# Patient Record
Sex: FEMALE | Race: WHITE | NOT HISPANIC OR LATINO | Employment: UNEMPLOYED | URBAN - METROPOLITAN AREA
[De-identification: names, ages, dates, MRNs, and addresses within clinical notes are randomized per-mention and may not be internally consistent; named-entity substitution may affect disease eponyms.]

---

## 2017-04-12 ENCOUNTER — APPOINTMENT (OUTPATIENT)
Dept: RADIOLOGY | Facility: MEDICAL CENTER | Age: 47
End: 2017-04-12
Attending: OBSTETRICS & GYNECOLOGY
Payer: MEDICAID

## 2017-04-12 ENCOUNTER — HOSPITAL ENCOUNTER (OUTPATIENT)
Facility: MEDICAL CENTER | Age: 47
End: 2017-04-12
Attending: OBSTETRICS & GYNECOLOGY | Admitting: OBSTETRICS & GYNECOLOGY
Payer: MEDICAID

## 2017-04-12 VITALS
OXYGEN SATURATION: 95 % | HEIGHT: 61 IN | HEART RATE: 49 BPM | RESPIRATION RATE: 18 BRPM | BODY MASS INDEX: 20.73 KG/M2 | SYSTOLIC BLOOD PRESSURE: 102 MMHG | TEMPERATURE: 97.5 F | DIASTOLIC BLOOD PRESSURE: 61 MMHG | WEIGHT: 109.79 LBS

## 2017-04-12 PROBLEM — N83.00 FOLLICULAR CYST OF OVARY: Status: ACTIVE | Noted: 2017-04-12

## 2017-04-12 LAB
ANION GAP SERPL CALC-SCNC: 7 MMOL/L (ref 0–11.9)
APTT PPP: 31 SEC (ref 24.7–36)
BASOPHILS # BLD AUTO: 0.5 % (ref 0–1.8)
BASOPHILS # BLD: 0.03 K/UL (ref 0–0.12)
BUN SERPL-MCNC: 19 MG/DL (ref 8–22)
CALCIUM SERPL-MCNC: 9.4 MG/DL (ref 8.5–10.5)
CHLORIDE SERPL-SCNC: 112 MMOL/L (ref 96–112)
CO2 SERPL-SCNC: 20 MMOL/L (ref 20–33)
CREAT SERPL-MCNC: 0.8 MG/DL (ref 0.5–1.4)
EKG IMPRESSION: NORMAL
EOSINOPHIL # BLD AUTO: 0.08 K/UL (ref 0–0.51)
EOSINOPHIL NFR BLD: 1.4 % (ref 0–6.9)
ERYTHROCYTE [DISTWIDTH] IN BLOOD BY AUTOMATED COUNT: 40.3 FL (ref 35.9–50)
GFR SERPL CREATININE-BSD FRML MDRD: >60 ML/MIN/1.73 M 2
GLUCOSE SERPL-MCNC: 90 MG/DL (ref 65–99)
HCG SERPL QL: NEGATIVE
HCT VFR BLD AUTO: 37.7 % (ref 37–47)
HGB BLD-MCNC: 12.9 G/DL (ref 12–16)
IMM GRANULOCYTES # BLD AUTO: 0.01 K/UL (ref 0–0.11)
IMM GRANULOCYTES NFR BLD AUTO: 0.2 % (ref 0–0.9)
INR PPP: 1.04 (ref 0.87–1.13)
LYMPHOCYTES # BLD AUTO: 2.12 K/UL (ref 1–4.8)
LYMPHOCYTES NFR BLD: 36.1 % (ref 22–41)
MCH RBC QN AUTO: 31.8 PG (ref 27–33)
MCHC RBC AUTO-ENTMCNC: 34.2 G/DL (ref 33.6–35)
MCV RBC AUTO: 92.9 FL (ref 81.4–97.8)
MONOCYTES # BLD AUTO: 0.37 K/UL (ref 0–0.85)
MONOCYTES NFR BLD AUTO: 6.3 % (ref 0–13.4)
NEUTROPHILS # BLD AUTO: 3.26 K/UL (ref 2–7.15)
NEUTROPHILS NFR BLD: 55.5 % (ref 44–72)
NRBC # BLD AUTO: 0 K/UL
NRBC BLD AUTO-RTO: 0 /100 WBC
PLATELET # BLD AUTO: 220 K/UL (ref 164–446)
PMV BLD AUTO: 11.8 FL (ref 9–12.9)
POTASSIUM SERPL-SCNC: 3.7 MMOL/L (ref 3.6–5.5)
PROTHROMBIN TIME: 13.9 SEC (ref 12–14.6)
RBC # BLD AUTO: 4.06 M/UL (ref 4.2–5.4)
SODIUM SERPL-SCNC: 139 MMOL/L (ref 135–145)
WBC # BLD AUTO: 5.9 K/UL (ref 4.8–10.8)

## 2017-04-12 PROCEDURE — 502240 HCHG MISC OR SUPPLY RC 0272: Performed by: OBSTETRICS & GYNECOLOGY

## 2017-04-12 PROCEDURE — 85610 PROTHROMBIN TIME: CPT

## 2017-04-12 PROCEDURE — A4338 INDWELLING CATHETER LATEX: HCPCS | Performed by: OBSTETRICS & GYNECOLOGY

## 2017-04-12 PROCEDURE — 501579 HCHG TROCAR, STEP 5MM: Performed by: OBSTETRICS & GYNECOLOGY

## 2017-04-12 PROCEDURE — 85025 COMPLETE CBC W/AUTO DIFF WBC: CPT

## 2017-04-12 PROCEDURE — 500854 HCHG NEEDLE, INSUFFLATION FOR STEP: Performed by: OBSTETRICS & GYNECOLOGY

## 2017-04-12 PROCEDURE — 501399 HCHG SPECIMAN BAG, ENDO CATC: Performed by: OBSTETRICS & GYNECOLOGY

## 2017-04-12 PROCEDURE — 160035 HCHG PACU - 1ST 60 MINS PHASE I: Performed by: OBSTETRICS & GYNECOLOGY

## 2017-04-12 PROCEDURE — 93010 ELECTROCARDIOGRAM REPORT: CPT | Performed by: INTERNAL MEDICINE

## 2017-04-12 PROCEDURE — 160009 HCHG ANES TIME/MIN: Performed by: OBSTETRICS & GYNECOLOGY

## 2017-04-12 PROCEDURE — 502704 HCHG DEVICE, LIGASURE IMPACT: Performed by: OBSTETRICS & GYNECOLOGY

## 2017-04-12 PROCEDURE — 160029 HCHG SURGERY MINUTES - 1ST 30 MINS LEVEL 4: Performed by: OBSTETRICS & GYNECOLOGY

## 2017-04-12 PROCEDURE — 501838 HCHG SUTURE GENERAL: Performed by: OBSTETRICS & GYNECOLOGY

## 2017-04-12 PROCEDURE — A9270 NON-COVERED ITEM OR SERVICE: HCPCS

## 2017-04-12 PROCEDURE — 88307 TISSUE EXAM BY PATHOLOGIST: CPT

## 2017-04-12 PROCEDURE — 160041 HCHG SURGERY MINUTES - EA ADDL 1 MIN LEVEL 4: Performed by: OBSTETRICS & GYNECOLOGY

## 2017-04-12 PROCEDURE — 160048 HCHG OR STATISTICAL LEVEL 1-5: Performed by: OBSTETRICS & GYNECOLOGY

## 2017-04-12 PROCEDURE — 500886 HCHG PACK, LAPAROSCOPY: Performed by: OBSTETRICS & GYNECOLOGY

## 2017-04-12 PROCEDURE — 93005 ELECTROCARDIOGRAM TRACING: CPT | Performed by: OBSTETRICS & GYNECOLOGY

## 2017-04-12 PROCEDURE — 84703 CHORIONIC GONADOTROPIN ASSAY: CPT

## 2017-04-12 PROCEDURE — 501330 HCHG SET, CYSTO IRRIG TUBING: Performed by: OBSTETRICS & GYNECOLOGY

## 2017-04-12 PROCEDURE — A4606 OXYGEN PROBE USED W OXIMETER: HCPCS | Performed by: OBSTETRICS & GYNECOLOGY

## 2017-04-12 PROCEDURE — 700101 HCHG RX REV CODE 250

## 2017-04-12 PROCEDURE — 110382 HCHG SHELL REV 271: Performed by: OBSTETRICS & GYNECOLOGY

## 2017-04-12 PROCEDURE — 160002 HCHG RECOVERY MINUTES (STAT): Performed by: OBSTETRICS & GYNECOLOGY

## 2017-04-12 PROCEDURE — 85730 THROMBOPLASTIN TIME PARTIAL: CPT

## 2017-04-12 PROCEDURE — 80048 BASIC METABOLIC PNL TOTAL CA: CPT

## 2017-04-12 PROCEDURE — 88305 TISSUE EXAM BY PATHOLOGIST: CPT | Mod: 59

## 2017-04-12 PROCEDURE — 700111 HCHG RX REV CODE 636 W/ 250 OVERRIDE (IP)

## 2017-04-12 PROCEDURE — 71010 DX-CHEST-PORTABLE (1 VIEW): CPT

## 2017-04-12 PROCEDURE — 501577 HCHG TROCAR, STEP 11MM: Performed by: OBSTETRICS & GYNECOLOGY

## 2017-04-12 PROCEDURE — 110371 HCHG SHELL REV 272: Performed by: OBSTETRICS & GYNECOLOGY

## 2017-04-12 PROCEDURE — 160036 HCHG PACU - EA ADDL 30 MINS PHASE I: Performed by: OBSTETRICS & GYNECOLOGY

## 2017-04-12 PROCEDURE — 501578 HCHG TROCAR, STEP 12MM: Performed by: OBSTETRICS & GYNECOLOGY

## 2017-04-12 PROCEDURE — 700102 HCHG RX REV CODE 250 W/ 637 OVERRIDE(OP)

## 2017-04-12 RX ORDER — RAMELTEON 8 MG/1
8 TABLET ORAL NIGHTLY
COMMUNITY
End: 2018-06-24

## 2017-04-12 RX ORDER — ONDANSETRON 2 MG/ML
4 INJECTION INTRAMUSCULAR; INTRAVENOUS EVERY 6 HOURS PRN
Status: DISCONTINUED | OUTPATIENT
Start: 2017-04-12 | End: 2017-04-12 | Stop reason: HOSPADM

## 2017-04-12 RX ORDER — MEPERIDINE HYDROCHLORIDE 25 MG/ML
INJECTION INTRAMUSCULAR; INTRAVENOUS; SUBCUTANEOUS
Status: COMPLETED
Start: 2017-04-12 | End: 2017-04-12

## 2017-04-12 RX ORDER — OXYCODONE HYDROCHLORIDE 5 MG/1
2.5 TABLET ORAL
Status: DISCONTINUED | OUTPATIENT
Start: 2017-04-12 | End: 2017-04-12 | Stop reason: HOSPADM

## 2017-04-12 RX ORDER — OXYCODONE HYDROCHLORIDE 5 MG/1
5 TABLET ORAL
Status: DISCONTINUED | OUTPATIENT
Start: 2017-04-12 | End: 2017-04-12 | Stop reason: HOSPADM

## 2017-04-12 RX ORDER — ACETAMINOPHEN 500 MG
1000 TABLET ORAL EVERY 6 HOURS
Status: DISCONTINUED | OUTPATIENT
Start: 2017-04-12 | End: 2017-04-12 | Stop reason: HOSPADM

## 2017-04-12 RX ORDER — SODIUM CHLORIDE, SODIUM LACTATE, POTASSIUM CHLORIDE, CALCIUM CHLORIDE 600; 310; 30; 20 MG/100ML; MG/100ML; MG/100ML; MG/100ML
1000 INJECTION, SOLUTION INTRAVENOUS
Status: COMPLETED | OUTPATIENT
Start: 2017-04-12 | End: 2017-04-12

## 2017-04-12 RX ORDER — BUPIVACAINE HYDROCHLORIDE AND EPINEPHRINE 2.5; 5 MG/ML; UG/ML
INJECTION, SOLUTION EPIDURAL; INFILTRATION; INTRACAUDAL; PERINEURAL
Status: DISCONTINUED | OUTPATIENT
Start: 2017-04-12 | End: 2017-04-12 | Stop reason: HOSPADM

## 2017-04-12 RX ORDER — SIMETHICONE 80 MG
80 TABLET,CHEWABLE ORAL EVERY 8 HOURS PRN
Status: DISCONTINUED | OUTPATIENT
Start: 2017-04-12 | End: 2017-04-12 | Stop reason: HOSPADM

## 2017-04-12 RX ORDER — OXYCODONE HCL 5 MG/5 ML
SOLUTION, ORAL ORAL
Status: COMPLETED
Start: 2017-04-12 | End: 2017-04-12

## 2017-04-12 RX ADMIN — OXYCODONE HYDROCHLORIDE 10 MG: 5 SOLUTION ORAL at 17:06

## 2017-04-12 RX ADMIN — SODIUM CHLORIDE, SODIUM LACTATE, POTASSIUM CHLORIDE, CALCIUM CHLORIDE 1000 ML: 600; 310; 30; 20 INJECTION, SOLUTION INTRAVENOUS at 13:55

## 2017-04-12 RX ADMIN — FENTANYL CITRATE 25 MCG: 50 INJECTION, SOLUTION INTRAMUSCULAR; INTRAVENOUS at 17:05

## 2017-04-12 RX ADMIN — FENTANYL CITRATE 25 MCG: 50 INJECTION, SOLUTION INTRAMUSCULAR; INTRAVENOUS at 18:26

## 2017-04-12 RX ADMIN — MEPERIDINE HYDROCHLORIDE 12.5 MG: 25 INJECTION INTRAMUSCULAR; INTRAVENOUS; SUBCUTANEOUS at 16:44

## 2017-04-12 ASSESSMENT — PAIN SCALES - GENERAL
PAINLEVEL_OUTOF10: 4
PAINLEVEL_OUTOF10: 4
PAINLEVEL_OUTOF10: 7
PAINLEVEL_OUTOF10: ASSUMED PAIN PRESENT
PAINLEVEL_OUTOF10: 10

## 2017-04-12 NOTE — OR SURGEON
Immediate Post-Operative Note      PreOp Diagnosis: Left dermoid cyst, right ovarian hemorrhagic cyst vs. dermoid    PostOp Diagnosis: Pathology pending      Procedure(s):  PELVISCOPY - LEFT SALPINGO-OOPHERECTOMY - Wound Class: Clean  SALPINGECTOMY AND OVARIAN BIOPSY  - Wound Class: Clean  CYSTOSCOPY - Wound Class: Clean Contaminated    Surgeon(s):  YUN Kincaid M.D.    Anesthesiologist/Type of Anesthesia:  Anesthesiologist: Herminia Dumont M.D./General    Surgical Staff:  Circulator: Olga Thomas R.N.  Scrub Person: Trinidad Viveros    Specimen: Left fallopian tube and ovary, right distal fallopian tube biopsy right ovary          Estimated Blood Loss: 20 ml.    Findings: Enlarged left ovarian cyst, small right ovarian cyst, bilateral eflux of flouracien from the right and left ovary.    Complications: None        4/12/2017 4:51 PM Aryan Rossi

## 2017-04-12 NOTE — IP AVS SNAPSHOT
4/12/2017    Isa Bowser  [address] [place] [address]    Dear Isa:    [facility] wants to ensure your discharge home is safe and you or your loved ones have had all of your questions answered regarding your care after you leave the hospital.    Below is a list of resources and contact information should you have any questions regarding your hospital stay, follow-up instructions, or active medical symptoms.    Questions or Concerns Regarding… Contact   Medical Questions Related to Your Discharge  (7 days a week, 8am-5pm) Contact a Nurse Care Coordinator   [phone]   Medical Questions Not Related to Your Discharge  (24 hours a day / 7 days a week)  Contact the Nurse Health Line   [phone]    Medications or Discharge Instructions Refer to your discharge packet   or contact your [facility] Primary Care Provider   [phone]   Follow-up Appointment(s) Schedule your appointment via [employer]   or contact Scheduling [phone]   Billing Review your statement via [employer]  or contact Billing [phone]   Medical Records Review your records via [employer]   or contact Medical Records [phone]     You may receive a telephone call within two days of discharge. This call is to make certain you understand your discharge instructions and have the opportunity to have any questions answered. You can also easily access your medical information, test results and upcoming appointments via the [employer] free online health management tool. You can learn more and sign up at [employer]. For assistance setting up your [employer] account, please call [phone].    Once again, we want to ensure your discharge home is safe and that you have a clear understanding of any next steps in your care. If you have any questions or concerns, please do not hesitate to contact us, we are here for you. Thank you for choosing [facility] for your healthcare needs.    Sincerely,    Your [facility] Healthcare Team

## 2017-04-12 NOTE — IP AVS SNAPSHOT
" Home Care Instructions                                                                                                                Name:Isa Bowser  Medical Record Number:[id number]  CSN: [id number]    YOB: 1970   Age: 46 y.o.  Sex: female  HT:1.549 m (5' 1\") WT: 49.8 kg (109 lb 12.6 oz)          Admit Date: 4/12/2017     Discharge Date:   Today's Date: 4/12/2017  Attending Doctor:  Aryan Rossi M.D.                  Allergies:  Review of patient's allergies indicates no known allergies.                Discharge Instructions         ACTIVITY: Rest and take it easy for the first 24 hours.  A responsible adult is recommended to remain with you during that time.  It is normal to feel sleepy.  We encourage you to not do anything that requires balance, judgment or coordination.    MILD FLU-LIKE SYMPTOMS ARE NORMAL. YOU MAY EXPERIENCE GENERALIZED MUSCLE ACHES, THROAT IRRITATION, HEADACHE AND/OR SOME NAUSEA.    FOR 24 HOURS DO NOT:  Drive, operate machinery or run household appliances.  Drink beer or alcoholic beverages.   Make important decisions or sign legal documents.    SPECIAL INSTRUCTIONS: *  SEE attached pelviscopy instruction sheet**   Please call for a temperature greater or equal to 100.4 degrees Farenheit, excessive vaginal bleeding, foul smelling discharge, or calf painCall Dr. Rossi for a temp > 100.4 degrees Farenheit, excessive bleeding, or foul smelling discharge.**    DIET: To avoid nausea, slowly advance diet as tolerated, avoiding spicy or greasy foods for the first day.  Add more substantial food to your diet according to your physician's instructions.  Babies can be fed formula or breast milk as soon as they are hungry.  INCREASE FLUIDS AND FIBER TO AVOID CONSTIPATION.    SURGICAL DRESSING/BATHING: **no tub baths/ soaking for 2 weeks*    FOLLOW-UP APPOINTMENT:  A follow-up appointment should be arranged with your doctor; call to schedule.    You should CALL YOUR PHYSICIAN " if you develop:  Fever greater than 101 degrees F.  Pain not relieved by medication, or persistent nausea or vomiting.  Excessive bleeding (blood soaking through dressing) or unexpected drainage from the wound.  Extreme redness or swelling around the incision site, drainage of pus or foul smelling drainage.  Inability to urinate or empty your bladder within 8 hours.  Problems with breathing or chest pain.    You should call 911 if you develop problems with breathing or chest pain.  If you are unable to contact your doctor or surgical center, you should go to the nearest emergency room or urgent care center.  Physician's telephone #: **[phone]*    If any questions arise, call your doctor.  If your doctor is not available, please feel free to call the Surgical Center at [phone].  The Center is open Monday through Friday from 7AM to 7PM.  You can also call the [facility] HOTLINE open 24 hours/day, 7 days/week and speak to a nurse at [phone], or toll free at [phone].    A registered nurse may call you a few days after your surgery to see how you are doing after your procedure.    MEDICATIONS: Resume taking daily medication.  Take prescribed pain medication with food.  If no medication is prescribed, you may take non-aspirin pain medication if needed.  PAIN MEDICATION CAN BE VERY CONSTIPATING.  Take a stool softener or laxative such as senokot, pericolace, or milk of magnesia if needed.    Prescription given for **norco*.  Last pain medication given at *5:06 pm**.    If your physician has prescribed pain medication that includes Acetaminophen (Tylenol), do not take additional Acetaminophen (Tylenol) while taking the prescribed medication.    Depression / Suicide Risk    As you are discharged from this [facility] facility, it is important to learn how to keep safe from harming yourself.    Recognize the warning signs:  · Abrupt changes in personality, positive or negative- including increase in energy     · Giving away possessions  · Change in eating patterns- significant weight changes-  positive or negative  · Change in sleeping patterns- unable to sleep or sleeping all the time   · Unwillingness or inability to communicate  · Depression  · Unusual sadness, discouragement and loneliness  · Talk of wanting to die  · Neglect of personal appearance   · Rebelliousness- reckless behavior  · Withdrawal from people/activities they love  · Confusion- inability to concentrate     If you or a loved one observes any of these behaviors or has concerns about self-harm, here's what you can do:  · Talk about it- your feelings and reasons for harming yourself  · Remove any means that you might use to hurt yourself (examples: pills, rope, extension cords, firearm)  · Get professional help from the community (Mental Health, Substance Abuse, psychological counseling)  · Do not be alone:Call your Safe Contact- someone whom you trust who will be there for you.  · Call your local CRISIS HOTLINE 611-8851 or 836-361-7669  · Call your local Children's Mobile Crisis Response Team [place] (222) 916-8725 or [employer]  · Call the toll free National Suicide Prevention Hotlines   · National Suicide Prevention Lifeline 121-485-VVWW (5465)  · National Hope Line Network 800-SUICIDE (594-5394)       Medication List      CONTINUE taking these medications        Instructions    Morning Afternoon Evening Bedtime    ROZEREM 8 MG tablet   Generic drug:  ramelteon        Take 8 mg by mouth every evening.   Dose:  8 mg                        TRAZAMINE PO        Take 200 mg by mouth.   Dose:  200 mg                                Medication Information     Above and/or attached are the medications your physician expects you to take upon discharge. Review all of your home medications and newly ordered medications with your doctor and/or pharmacist. Follow medication instructions as directed by your doctor and/or pharmacist. Please keep  your medication list with you and share with your physician. Update the information when medications are discontinued, doses are changed, or new medications (including over-the-counter products) are added; and carry medication information at all times in the event of emergency situations.        Resources     Quit Smoking / Tobacco Use:    I understand the use of any tobacco products increases my chance of suffering from future heart disease or stroke and could cause other illnesses which may shorten my life. Quitting the use of tobacco products is the single most important thing I can do to improve my health. For further information on smoking / tobacco cessation call a Toll Free Quit Line at 1-606.995.8646 (*National [employer] [place]) or 1-766.245.5608 (American Lung Association) or you can access the web based program at www.lungusa.org.    [place] Tobacco Users Help Line:  (424) 342-4951       Toll Free: 1-840.112.7342  Quit Tobacco Program [facility] (224)295-9091    Crisis Hotline:    [place] Crisis Hotline:  1-413-VMESGAK or 1-361.545.8521    [place] Crisis Hotline:    1-691.216.3978 or 593-573-3876    Discharge Survey:   Thank you for choosing [facility]. We hope we did everything we could to make your hospital stay a pleasant one. You may be receiving a survey and we would appreciate your time and participation in answering the questions. Your input is very valuable to us in our efforts to improve our service to our patients and their families.            Signatures     My signature on this form indicates that:    1. I acknowledge receipt and understanding of these Home Care Instruction.  2. My questions regarding this information have been answered to my satisfaction.  3. I have formulated a plan with my discharge nurse to obtain my prescribed medications for home.    __________________________________      __________________________________                   Patient Signature                                  Guardian/Responsible Adult Signature      __________________________________                 __________       ________                       Nurse Signature                                               Date                 Time

## 2017-04-12 NOTE — H&P
HISTORY OF PRESENT ILLNESS:  The patient is a 46-year-old white female,    5, para 2, last menstrual period 2017 referred by Dr. Silver   for evaluation of bilateral ovarian cysts.  The patient complains of severe   lower abdominal pain.  The patient underwent a CT scan 0m 2017, which   revealed a prominent left adnexal mass.  There was also a small right ovarian   cyst.  The left adnexal mass was believed to be a dermoid cyst.  The patient   has a history of dermoid cyst of approximately 4 years.  She has a small   leiomyoma at 0.1 cm.  There is mild thickening of the endometrial stripe.  The   patient underwent followup with a transvaginal ultrasound.  A CA-125 had been   ordered and is pending.    PAST MEDICAL HISTORY:  Significant for hepatitis C, which has evidently   resolved.  She had a history of methamphetamine use, but states that she has   been drug free, hepatitis C, anxiety and depression.    CURRENT MEDICATIONS:  Includes Colace 100 mg orally 1 tab daily, Invega 6 mg   oral tablet extended release 1 tablet daily, MiraLax oral powder for   reconstitution 17 g daily 255 g, Rozerem 8 mg 1 tab orally at bedtime,   bupropion 150 mg extended release 1 tab orally daily, citalopram 40 mg 1   tablet orally daily, clonazepam 0.5 mg 1 tab orally daily, pantoprazole 40 mg   1 tablet orally daily, and propranolol 10 mg 1 tablet orally _____.  The   patient was seen at [facility] and placed on ProAir HFA 90   mcg 2 puffs inhaler q.4-6 hours, Zofran 4 mg 1 tab q. 8 hours and   promethazine 12.5 mg for nausea and vomiting.    ALLERGIES:  No known allergies.    HABITS:  The patient smokes 1/2 pack of cigarettes and has done so for 30   years.  She does not partake of alcoholic beverages.  The patient has been   treated in the past for drug abuse.    REVIEW OF SYSTEMS:  The patient complains of chills, sweats, weight loss, weakness, and fatigue.  EYES:  No change in vision.  EAR,  NOSE, THROAT:  She denies any nosebleeds, sore throat, or dry mouth.  CARDIOVASCULAR:  She denies any dizziness, shortness breath, chest pain, or   heart palpitations.  RESPIRATORY:  The patient denies any cough, shortness breath, or wheezing.  GASTROINTESTINAL:  She complains of abdominal pain, nausea, vomiting, change   in appetite, constipation and diarrhea.  HEMATOLOGICAL LYMPHATIC:  She denies any easy bruisability or swollen lymph   glands.  GYNECOLOGIC:  She complains of bleeding with intercourse and pelvic pain.  URINARY:  The patient denies any painful urination, frequent urination,   urgency, blood in the urine or nocturia.  MUSCULOSKELETAL:  She complains of weakness.  INTEGUMENT:  The patient denies any knowledge of hair loss or growth, breast   lumps.  NEUROLOGIC:  The patient complains of numbness and tingling.  ENDOCRINE:  She complains of night sweats, sleep disturbances.    PAST SURGICAL HISTORY:  Significant for cholecystectomy and removal of a   cervical polyp.    FAMILY HISTORY:  The patient's father is 65 years in poor health.  He suffers   from hepatitis C and IBS.  Her mother is a 63-year-old in good health.    PHYSICAL EXAMINATION:  GENERAL:  This is a white female, who is in moderate distress, who states that   she has lost significant amount of weight.  VITAL SIGNS:  She is 61 inches tall, 111 pounds, blood pressure 100/60, pulse   68, respirations 16, O2 sat 98%.  Urine is negative for blood glucose, trace   protein, negative nitrites, negative leukocytes, negative ketones, negative   bilirubin, normal urobilinogen.  Specific gravity is 1.005, pH is 7.  GENERAL APPEARANCE:  In general this is a thin white female, _____ appearing   older than her stated age.  HEENT:  Head normocephalic without sign of trauma.  SKIN:  No rashes, changes, or cyanosis.  NECK:  Supple.  Full range of motion.  Trachea midline.  No thyromegaly.  LUNGS:  Clear to auscultation and percussion.  HEART:  S1, S2  normal.  No S3, S4.  No lifts, rubs or heaves.  BREASTS:  Reveal no lumps, masses, nipple discharge, no supraclavicular   adenopathy, no axial adenopathy.  ABDOMEN:  Flat.  Bowel sounds positive.  No splenomegaly.  No hepatomegaly.  GENITALIA:  Female escutcheon.  Bartholin, urethral, and Hoback glands are   normal.  Lymphatic groin nodes not enlarged.  Urethra:  No masses, tenderness,   or scarring.  Vagina:  No lesions.  Vaginal estrogen effect is present.    Cystocele absent.  Cervix nontender to motion.  Uterus is approximately 6-8   weeks in size.  There is left adnexal mass and right adnexal tenderness.    IMPRESSION:  1.  Left dermoid cyst.  2.  Possible bilateral right dermoid cyst versus hemorrhagic cyst.  3.  History of hepatitis C.  4.  Weight loss.  5.  History of methamphetamine use.  6.  Tobacco abuse.  7.  Status post cholecystectomy.  8.  Anxiety.  9.  Depression.    RECOMMENDATION:  I have had a long thorough discussion with the patient and   her mother-in-law regarding the risks, benefits, and alternatives to   laparoscopically assisted left salpingooophorectomy and right ovarian   cystectomy.  We discussed some serious and significant risks to include, but   not limited to the risks of anesthesia, infection, bleeding, injury to the   bowel, bladder, ureter, or pelvic vessels.  We discussed infection, bleeding,   nerve injury, blood clots, and potential heart attack and pneumonia.  Informed   consent was received and informed consent form written and signed in my   private office.  All the patient's questions were answered to her   satisfaction.  The patient will undergo surgery on 04/12/2017.       ____________________________________     ALICIA DELUCA MD    RWFELIX / MARLIN    DD:  04/12/2017 06:49:26  DT:  04/12/2017 08:35:42    D#:  [id number]  Job#:  [id number]    cc: ANÍBAL NOLASCO MD

## 2017-04-12 NOTE — PROGRESS NOTES
1637-received pt from OR with report from Dr Dumont.  VSS. Pt drowsy, denies pain. 3 lap incisions noted to abdomen, covered with bandaids, CDI. Abdomen flat, soft. Kendra pad dry.  Pt medicated for shivers with demerol IV  1658-warmer on, pt c/o pain. Medicated with oxycodone PO with sips of water, fentanyl IV  1730-pt very sleepy, no distress noted. Family updated in waiting area, given rx.   1805-pt awakens to voice, weaned to RA. States abdomen feels 'sore,' unable to give number. Tolerating PO. Ice pack to abdomen.  1820-mom brought to bedside.  Pt rates pain 10/10, medicated with fentanyl IV,   1840-pt rates pain 4/10, tolerable.  Expresses urge to void, assisted up to restroom. Able to void without difficulty. Pt reports minimal bleeding. Dressed, back to recliner, waiting for spouse to return.  1900-spouse at bedside. Pt meets criteria for discharge, expresses readiness. Instructions reviewed, pt and family express understanding. IV dc'd. Pt discharged via wheelchair at 1909

## 2017-04-13 NOTE — DISCHARGE INSTRUCTIONS
ACTIVITY: Rest and take it easy for the first 24 hours.  A responsible adult is recommended to remain with you during that time.  It is normal to feel sleepy.  We encourage you to not do anything that requires balance, judgment or coordination.    MILD FLU-LIKE SYMPTOMS ARE NORMAL. YOU MAY EXPERIENCE GENERALIZED MUSCLE ACHES, THROAT IRRITATION, HEADACHE AND/OR SOME NAUSEA.    FOR 24 HOURS DO NOT:  Drive, operate machinery or run household appliances.  Drink beer or alcoholic beverages.   Make important decisions or sign legal documents.    SPECIAL INSTRUCTIONS: *  SEE attached pelviscopy instruction sheet**   Please call for a temperature greater or equal to 100.4 degrees Farenheit, excessive vaginal bleeding, foul smelling discharge, or calf painCall Dr. Rossi for a temp > 100.4 degrees Farenheit, excessive bleeding, or foul smelling discharge.**    DIET: To avoid nausea, slowly advance diet as tolerated, avoiding spicy or greasy foods for the first day.  Add more substantial food to your diet according to your physician's instructions.  Babies can be fed formula or breast milk as soon as they are hungry.  INCREASE FLUIDS AND FIBER TO AVOID CONSTIPATION.    SURGICAL DRESSING/BATHING: **no tub baths/ soaking for 2 weeks*    FOLLOW-UP APPOINTMENT:  A follow-up appointment should be arranged with your doctor; call to schedule.    You should CALL YOUR PHYSICIAN if you develop:  Fever greater than 101 degrees F.  Pain not relieved by medication, or persistent nausea or vomiting.  Excessive bleeding (blood soaking through dressing) or unexpected drainage from the wound.  Extreme redness or swelling around the incision site, drainage of pus or foul smelling drainage.  Inability to urinate or empty your bladder within 8 hours.  Problems with breathing or chest pain.    You should call 911 if you develop problems with breathing or chest pain.  If you are unable to contact your doctor or surgical center, you should go to  the nearest emergency room or urgent care center.  Physician's telephone #: **[phone]*    If any questions arise, call your doctor.  If your doctor is not available, please feel free to call the Surgical Center at [phone].  The Center is open Monday through Friday from 7AM to 7PM.  You can also call the HEALTH HOTLINE open 24 hours/day, 7 days/week and speak to a nurse at [phone], or toll free at [phone].    A registered nurse may call you a few days after your surgery to see how you are doing after your procedure.    MEDICATIONS: Resume taking daily medication.  Take prescribed pain medication with food.  If no medication is prescribed, you may take non-aspirin pain medication if needed.  PAIN MEDICATION CAN BE VERY CONSTIPATING.  Take a stool softener or laxative such as senokot, pericolace, or milk of magnesia if needed.    Prescription given for **norco*.  Last pain medication given at *5:06 pm**.    If your physician has prescribed pain medication that includes Acetaminophen (Tylenol), do not take additional Acetaminophen (Tylenol) while taking the prescribed medication.    Depression / Suicide Risk    As you are discharged from this [facility] facility, it is important to learn how to keep safe from harming yourself.    Recognize the warning signs:  · Abrupt changes in personality, positive or negative- including increase in energy   · Giving away possessions  · Change in eating patterns- significant weight changes-  positive or negative  · Change in sleeping patterns- unable to sleep or sleeping all the time   · Unwillingness or inability to communicate  · Depression  · Unusual sadness, discouragement and loneliness  · Talk of wanting to die  · Neglect of personal appearance   · Rebelliousness- reckless behavior  · Withdrawal from people/activities they love  · Confusion- inability to concentrate     If you or a loved one observes any of these behaviors or has concerns about self-harm,  here's what you can do:  · Talk about it- your feelings and reasons for harming yourself  · Remove any means that you might use to hurt yourself (examples: pills, rope, extension cords, firearm)  · Get professional help from the community (Mental Health, Substance Abuse, psychological counseling)  · Do not be alone:Call your Safe Contact- someone whom you trust who will be there for you.  · Call your local CRISIS HOTLINE 031-7598 or 056-517-2603  · Call your local Children's Mobile Crisis Response Team [place] (260) 968-2140 or www.[employer]  · Call the toll free National Suicide Prevention Hotlines   · National Suicide Prevention Lifeline 592-575-ETRR (7555)  · National Hope Line Network 800-SUICIDE (176-3168)

## 2017-04-13 NOTE — OP REPORT
DATE OF SERVICE:  04/12/2017    PREOPERATIVE DIAGNOSIS:  Left dermoid cyst, right dermoid cyst versus right   hemorrhagic ovarian cyst.    POSTOPERATIVE DIAGNOSIS:  Pathology pending.    OPERATION:  Laparoscopic-assisted left salpingo-oophorectomy, right ovarian   cystectomy, right partial distal salpingectomy, and cystoscopy.  Status post   previous tubal ligation.  The patient has completed her family and does not   wish more children.    SURGEON:  Aryan Rossi MD    ASSISTANT:  Michelle Carmona MD    ANESTHESIA:  General endotracheal.    ANESTHESIOLOGIST:  Herminia Dumont MD    ESTIMATED BLOOD LOSS:  20 mL    DRAINS:  Bill to gravity.    ANTIBIOTICS:  Ancef 1 g IV piggyback prior to the operation.    PROPHYLAXIS:  Sequential stockings in place.    FINDINGS:  Exam under anesthesia revealed the uterus to be approximately 6-7   weeks in size, smooth, mobile with a left adnexal mass.  Diagnostic   laparoscopy revealed a 4-5 cm left ovarian cyst.  The patient had undergone a   previous tubal ligation.  There was a firm area that the tip of the ovary   which was excised.  CT scan had revealed a left ovarian cyst, possible   dermoid, possible right hemorrhagic cyst, possible bilateral dermoid.    Bilateral efflux of urine from the right and left ureter.  Bilateral efflux   with fluorescent was seen to jet from the right and left ureter    DESCRIPTION OF PROCEDURE:  Patient was taken to the operating room and placed   on the operating room table where general endotracheal anesthesia was   administered.  The patient was placed in modified dorsal lithotomy position,   prepped and draped in sterile fashion.  A Bill catheter was put in place.  A   timeout was called.  The patient and the operation, her birthdate and   allergies were identified.  This was confirmed to be correct.  Exam under   anesthesia was performed.  A weighted speculum was placed in the vagina.  The   anterior lip of the cervix was grasped with a  single tooth tenaculum.  The   cervix was stenotic and could not be dilated; therefore a sponge stick was   placed in the vagina along with a single tooth tenaculum, pulling the anterior   lip of the cervix.  The weighted speculum was removed.  The subumbilical   region was injected with 0.25% Marcaine with epinephrine.  A 12 mm semilunar   incision was made.  Through this incision, the fascia was grasped, elevated,   incised, and an open Hope cannula was placed and insufflated.  Diagnostic   laparoscopy was performed.  The right upper quadrant was without gross   pathology.  There was no pelvic endometriosis appreciated or significant   pelvic adhesions.  There was enlarged left ovarian cyst and right ovarian area   of possible endometriosis.  A left lower quadrant injection with 0.25%   Marcaine with epinephrine was performed.  A 12 mm transverse incision was made   to 2 cm medial and 2 cm superior to the anterior superior iliac crest.    Following incision after injection with Marcaine, A Veress step needle was   inserted into the peritoneal cavity.  The needle was removed and a #12 step   trocar was placed on the right lower quadrant, 2 cm medial and 2 cm superior   to the anterior superior iliac crest.  The area was injected and a 5 mm   transverse incision was made.  Through this incision, a Veress step needle was   placed and a 5 mm step trocar was introduced.  A Prestige instrument was   placed through the right lower quadrant and a Maryland instrument was placed   in the left lower quadrant.  Using the Covidien LigaSure instrument at a   setting of 3 bars, left pelvic infundibular ligament was cauterized after   visualizing the left ureter.  The left uterine ovarian ligament was   cauterized.  The left round ligament was cauterized.  Separate the adhesions   of the ovary the sidewall were dissected using sharp and blunt dissection with   Prater scissors.  Elevating the ovary, the left tube and ovary were  removed.  I   called for a frozen section and pathology presented stating to distinguish   the mature from an immature cystic teratoma.  Permanent section will be   needed.  Therefore, no frozen section was performed.  An Endo pouch bag was   placed through the left lower quadrant 12 mm trocar.  The ovary and tube were   placed in the bag and removed from the left lower quadrant incisional sites.    Attention was focused on the small right ovarian cyst.  Placing the Maryland   on the right uterine ovarian ligament, the ovary was elevated.  The patient   had had a partial salpingectomy and her right distal fallopian tube was   removed to decrease the potential for ovarian cancer.  The area suggestive of   pelvic endometriosis or a cyst was cauterized and excised.  There was good   hemostasis from the ovary.  This was watched for a while and there was no   excessive bleeding.  There was minimal blood loss.  Under direct vision, the   12 mm trocar was removed.  The specimen had been removed through the main   port.  Specimen will be sent labeled left tube and ovary and right ovarian   biopsy.  CO2 was allowed to escape from peritoneal cavity.  Under direct   vision, the laparoscope was removed as was the Hope trocar.  Stay sutures,   which had been placed were elevated and 4 sutures of 0 Vicryl were placed to   close the subumbilical incision.  Stay sutures were cut.  The subumbilical   incision was closed with 4-0 Vicryl.  The right lower quadrant 5 mm incision   was closed with 4-0 Vicryl, 2 separate sutures of 2-0 Vicryl were placed in   the left oblique left lower quadrant.  A 4-0 Vicryl was used to close this   incision.  A cystoscopy was then performed.  The Bill catheter was removed.    Using a 70-degree cystoscope, cystoscopy was performed _____ 0.5 mg IV had   been given by Dr. Dumont.  Urine was seen to jet from the right ureteral   orifice and the left ureteral orifice and fluoroscein was seen to jet from  the   left ureteral orifice and the right ureteral orifice.  Pictures were taken.    Under direct vision, the cystoscope was removed.  The needle and pack count   were correct.  The patient was taken to recovery room in satisfactory   condition.  Prior to the operation in my private office and before the   surgery, we discussed some serious and significant risks to include the risk   of anesthesia, infection, bleeding, damage to the bowel, bladder, ureter, or   pelvic vessels.  Informed consent was received.  All of the patient's   questions were answered to her satisfaction.  Informed consent form was   written and signed in my office.       ____________________________________     MD MARLA CHILDRESS / MARLIN    DD:  04/12/2017 16:47:30  DT:  04/12/2017 18:28:09    D#:  [id number]  Job#:  [id number]    cc:  [facility]

## 2018-06-24 ENCOUNTER — HOSPITAL ENCOUNTER (EMERGENCY)
Facility: MEDICAL CENTER | Age: 48
End: 2018-06-24
Attending: EMERGENCY MEDICINE
Payer: MEDICAID

## 2018-06-24 VITALS
RESPIRATION RATE: 16 BRPM | TEMPERATURE: 98.8 F | SYSTOLIC BLOOD PRESSURE: 112 MMHG | DIASTOLIC BLOOD PRESSURE: 74 MMHG | BODY MASS INDEX: 19.29 KG/M2 | WEIGHT: 120 LBS | OXYGEN SATURATION: 92 % | HEART RATE: 69 BPM | HEIGHT: 66 IN

## 2018-06-24 DIAGNOSIS — F10.920 ALCOHOLIC INTOXICATION WITHOUT COMPLICATION (HCC): ICD-10-CM

## 2018-06-24 DIAGNOSIS — F32.9 REACTIVE DEPRESSION: ICD-10-CM

## 2018-06-24 DIAGNOSIS — F10.939 ALCOHOL WITHDRAWAL SYNDROME WITH COMPLICATION (HCC): ICD-10-CM

## 2018-06-24 LAB
ALBUMIN SERPL BCP-MCNC: 4.7 G/DL (ref 3.2–4.9)
ALBUMIN/GLOB SERPL: 1.7 G/DL
ALP SERPL-CCNC: 66 U/L (ref 30–99)
ALT SERPL-CCNC: 28 U/L (ref 2–50)
AMPHET UR QL SCN: NEGATIVE
ANION GAP SERPL CALC-SCNC: 13 MMOL/L (ref 0–11.9)
APAP SERPL-MCNC: <10 UG/ML (ref 10–30)
AST SERPL-CCNC: 50 U/L (ref 12–45)
BARBITURATES UR QL SCN: NEGATIVE
BASOPHILS # BLD AUTO: 0.7 % (ref 0–1.8)
BASOPHILS # BLD: 0.05 K/UL (ref 0–0.12)
BENZODIAZ UR QL SCN: POSITIVE
BILIRUB SERPL-MCNC: 1 MG/DL (ref 0.1–1.5)
BUN SERPL-MCNC: 9 MG/DL (ref 8–22)
BZE UR QL SCN: NEGATIVE
CALCIUM SERPL-MCNC: 8.7 MG/DL (ref 8.5–10.5)
CANNABINOIDS UR QL SCN: POSITIVE
CHLORIDE SERPL-SCNC: 111 MMOL/L (ref 96–112)
CO2 SERPL-SCNC: 19 MMOL/L (ref 20–33)
CREAT SERPL-MCNC: 0.69 MG/DL (ref 0.5–1.4)
EOSINOPHIL # BLD AUTO: 0.01 K/UL (ref 0–0.51)
EOSINOPHIL NFR BLD: 0.1 % (ref 0–6.9)
ERYTHROCYTE [DISTWIDTH] IN BLOOD BY AUTOMATED COUNT: 42 FL (ref 35.9–50)
ETHANOL BLD-MCNC: 0.33 G/DL
GLOBULIN SER CALC-MCNC: 2.8 G/DL (ref 1.9–3.5)
GLUCOSE SERPL-MCNC: 80 MG/DL (ref 65–99)
HCG SERPL QL: NEGATIVE
HCT VFR BLD AUTO: 41.9 % (ref 37–47)
HGB BLD-MCNC: 14.8 G/DL (ref 12–16)
IMM GRANULOCYTES # BLD AUTO: 0.01 K/UL (ref 0–0.11)
IMM GRANULOCYTES NFR BLD AUTO: 0.1 % (ref 0–0.9)
LYMPHOCYTES # BLD AUTO: 2.79 K/UL (ref 1–4.8)
LYMPHOCYTES NFR BLD: 41.3 % (ref 22–41)
MCH RBC QN AUTO: 33.3 PG (ref 27–33)
MCHC RBC AUTO-ENTMCNC: 35.3 G/DL (ref 33.6–35)
MCV RBC AUTO: 94.2 FL (ref 81.4–97.8)
METHADONE UR QL SCN: NEGATIVE
MONOCYTES # BLD AUTO: 0.4 K/UL (ref 0–0.85)
MONOCYTES NFR BLD AUTO: 5.9 % (ref 0–13.4)
NEUTROPHILS # BLD AUTO: 3.49 K/UL (ref 2–7.15)
NEUTROPHILS NFR BLD: 51.9 % (ref 44–72)
NRBC # BLD AUTO: 0 K/UL
NRBC BLD-RTO: 0 /100 WBC
OPIATES UR QL SCN: NEGATIVE
OXYCODONE UR QL SCN: NEGATIVE
PCP UR QL SCN: NEGATIVE
PLATELET # BLD AUTO: 277 K/UL (ref 164–446)
PMV BLD AUTO: 10.4 FL (ref 9–12.9)
POTASSIUM SERPL-SCNC: 3.6 MMOL/L (ref 3.6–5.5)
PROPOXYPH UR QL SCN: NEGATIVE
PROT SERPL-MCNC: 7.5 G/DL (ref 6–8.2)
RBC # BLD AUTO: 4.45 M/UL (ref 4.2–5.4)
SALICYLATES SERPL-MCNC: 0 MG/DL (ref 15–25)
SODIUM SERPL-SCNC: 143 MMOL/L (ref 135–145)
WBC # BLD AUTO: 6.8 K/UL (ref 4.8–10.8)

## 2018-06-24 PROCEDURE — 80307 DRUG TEST PRSMV CHEM ANLYZR: CPT

## 2018-06-24 PROCEDURE — 80053 COMPREHEN METABOLIC PANEL: CPT

## 2018-06-24 PROCEDURE — 700111 HCHG RX REV CODE 636 W/ 250 OVERRIDE (IP): Performed by: EMERGENCY MEDICINE

## 2018-06-24 PROCEDURE — 700111 HCHG RX REV CODE 636 W/ 250 OVERRIDE (IP)

## 2018-06-24 PROCEDURE — 84703 CHORIONIC GONADOTROPIN ASSAY: CPT

## 2018-06-24 PROCEDURE — 36415 COLL VENOUS BLD VENIPUNCTURE: CPT

## 2018-06-24 PROCEDURE — 96375 TX/PRO/DX INJ NEW DRUG ADDON: CPT

## 2018-06-24 PROCEDURE — 99285 EMERGENCY DEPT VISIT HI MDM: CPT

## 2018-06-24 PROCEDURE — 85025 COMPLETE CBC W/AUTO DIFF WBC: CPT

## 2018-06-24 PROCEDURE — 90791 PSYCH DIAGNOSTIC EVALUATION: CPT

## 2018-06-24 PROCEDURE — 96374 THER/PROPH/DIAG INJ IV PUSH: CPT

## 2018-06-24 PROCEDURE — 96376 TX/PRO/DX INJ SAME DRUG ADON: CPT

## 2018-06-24 RX ORDER — VENLAFAXINE HYDROCHLORIDE 37.5 MG/1
37.5 CAPSULE, EXTENDED RELEASE ORAL DAILY
Status: ON HOLD | COMMUNITY
End: 2018-07-19

## 2018-06-24 RX ORDER — CITALOPRAM 20 MG/1
20 TABLET ORAL EVERY MORNING
COMMUNITY

## 2018-06-24 RX ORDER — LORAZEPAM 2 MG/ML
INJECTION INTRAMUSCULAR
Status: COMPLETED
Start: 2018-06-24 | End: 2018-06-24

## 2018-06-24 RX ORDER — HALOPERIDOL 5 MG/ML
INJECTION INTRAMUSCULAR
Status: COMPLETED
Start: 2018-06-24 | End: 2018-06-24

## 2018-06-24 RX ORDER — ZOLPIDEM TARTRATE 5 MG/1
5 TABLET ORAL NIGHTLY PRN
COMMUNITY

## 2018-06-24 RX ORDER — PANTOPRAZOLE SODIUM 40 MG/1
40 TABLET, DELAYED RELEASE ORAL DAILY
COMMUNITY
End: 2018-07-12

## 2018-06-24 RX ORDER — DIPHENHYDRAMINE HYDROCHLORIDE 50 MG/ML
25 INJECTION INTRAMUSCULAR; INTRAVENOUS ONCE
Status: COMPLETED | OUTPATIENT
Start: 2018-06-24 | End: 2018-06-24

## 2018-06-24 RX ORDER — LORAZEPAM 2 MG/ML
2 INJECTION INTRAMUSCULAR ONCE
Status: COMPLETED | OUTPATIENT
Start: 2018-06-24 | End: 2018-06-24

## 2018-06-24 RX ORDER — HALOPERIDOL 5 MG/ML
3 INJECTION INTRAMUSCULAR ONCE
Status: COMPLETED | OUTPATIENT
Start: 2018-06-24 | End: 2018-06-24

## 2018-06-24 RX ORDER — DIAZEPAM 5 MG/1
5 TABLET ORAL EVERY 8 HOURS PRN
Qty: 6 TAB | Refills: 0 | Status: SHIPPED | OUTPATIENT
Start: 2018-06-24 | End: 2018-06-26

## 2018-06-24 RX ORDER — LORAZEPAM 2 MG/ML
1 INJECTION INTRAMUSCULAR ONCE
Status: COMPLETED | OUTPATIENT
Start: 2018-06-24 | End: 2018-06-24

## 2018-06-24 RX ORDER — DIPHENHYDRAMINE HYDROCHLORIDE 50 MG/ML
INJECTION INTRAMUSCULAR; INTRAVENOUS
Status: COMPLETED
Start: 2018-06-24 | End: 2018-06-24

## 2018-06-24 RX ORDER — DIPHENHYDRAMINE HYDROCHLORIDE 50 MG/ML
INJECTION INTRAMUSCULAR; INTRAVENOUS
Status: DISCONTINUED
Start: 2018-06-24 | End: 2018-06-25 | Stop reason: HOSPADM

## 2018-06-24 RX ORDER — HYDROXYZINE HYDROCHLORIDE 25 MG/1
25-50 TABLET, FILM COATED ORAL
COMMUNITY
End: 2018-07-12

## 2018-06-24 RX ADMIN — HALOPERIDOL 3 MG: 5 INJECTION INTRAMUSCULAR at 11:33

## 2018-06-24 RX ADMIN — LORAZEPAM 2 MG: 2 INJECTION INTRAMUSCULAR at 11:33

## 2018-06-24 RX ADMIN — HALOPERIDOL LACTATE 3 MG: 5 INJECTION, SOLUTION INTRAMUSCULAR at 11:33

## 2018-06-24 RX ADMIN — LORAZEPAM 2 MG: 2 INJECTION INTRAMUSCULAR; INTRAVENOUS at 11:33

## 2018-06-24 RX ADMIN — DIPHENHYDRAMINE HYDROCHLORIDE 25 MG: 50 INJECTION INTRAMUSCULAR; INTRAVENOUS at 11:33

## 2018-06-24 RX ADMIN — LORAZEPAM 1 MG: 2 INJECTION INTRAMUSCULAR; INTRAVENOUS at 16:15

## 2018-06-24 RX ADMIN — DIPHENHYDRAMINE HYDROCHLORIDE 25 MG: 50 INJECTION INTRAMUSCULAR; INTRAVENOUS at 16:15

## 2018-06-24 ASSESSMENT — LIFESTYLE VARIABLES: SUBSTANCE_ABUSE: 1

## 2018-06-24 NOTE — ED NOTES
Per ERP at bedside, requests remaining (2) restraints removed at this time. Security notified and at bedside reference same

## 2018-06-24 NOTE — ED NOTES
Patient secured in restraints to bilateral wrists and ankles on stomach per VORB with ERP Nura Santoyo. Patient placed on continuous cardiac, blood pressure and pulse oximetry monitoring.

## 2018-06-24 NOTE — ED NOTES
Med Rec complete per Pt's RX bottles at bedside (returned) and PT's home pharmacy [facility] @[phone]  Allergies Reviewed  Pt unable to participate in interview

## 2018-06-24 NOTE — ED PROVIDER NOTES
ED Provider Note    Scribed for Nura Santoyo M.D. by Yue Mcdowell. 6/24/2018, 11:29 AM.    Primary care provider: Mary Lou Silver M.D.  Means of arrival: Ambulance  History obtained from: ED staff  History limited by: Patient's Non-cooperative Nature    CHIEF COMPLAINT  Chief Complaint   Patient presents with   • Legal 2000     SI with plan to stab self and burn house down, placed on hold by officer, overdose on ambien       HPI  Isa Bowser is a 47 y.o. female who presents to the Emergency Department via EMS post ingestion of approximately 20 tablets of Ambien. EMS found an Ambien prescription bottle that was filled 20 days ago with 2 remaining tablets in the bottle. Additionally the patient admitted to drinking alcohol. Per nursing note, patient reported suicidal plan to stab herself and burn the house down.     HPI is limited secondary to patient's non-cooperative nature.      REVIEW OF SYSTEMS  Review of Systems   Psychiatric/Behavioral: Positive for substance abuse and suicidal ideas.   ROS is limited secondary to patient's non-cooperative nature.  C.    PAST MEDICAL HISTORY   has a past medical history of Alcohol abuse; Bowel habit changes; Heart burn; Hepatitis C; Infectious disease; Pituitary tumor; and Psychiatric disorder.    SURGICAL HISTORY   has a past surgical history that includes other abdominal surgery; gyn surgery (2006); cholecystectomy; tubal coagulation laparoscopic bilateral; pelviscopy (4/12/2017); ovarian cystectomy (Right, 4/12/2017); and cystoscopy (4/12/2017).    SOCIAL HISTORY  Social History   Substance Use Topics   • Smoking status: Current Every Day Smoker           Comment: 12 per day for 20 years   • Alcohol use No      History   Drug Use   • Types: Marijuana     Comment: Marijuana       FAMILY HISTORY  None noted       CURRENT MEDICATIONS  Home Medications     Reviewed by Star Ortiz (Pharmacy Tech) on 06/24/18 at 1221  Med List Status: Complete  "  Medication Last Dose Status   citalopram (CELEXA) 20 MG Tab UNK Active   hydrOXYzine HCl (ATARAX) 25 MG Tab UNK Active   pantoprazole (PROTONIX) 40 MG Tablet Delayed Response UNK Active   venlafaxine XR (EFFEXOR XR) 37.5 MG CAPSULE SR 24 HR UNK Active   zolpidem (AMBIEN) 5 MG Tab UNK Active                ALLERGIES  No Known Allergies      PHYSICAL EXAM  VITAL SIGNS: /74   Pulse 66   Temp 37.1 °C (98.8 °F)   Resp 16   Ht 1.676 m (5' 6\")   Wt 54.4 kg (120 lb)   SpO2 98%   BMI 19.37 kg/m²   Constitutional:  Mild distress. Non cooperative and agitated. Restrained by law enforcement.   HENT:  Moist mucous membranes  Eyes:  No conjunctivitis or icterus  Neck:  trachea is midline, no palpable thyroid  Lymphatic:  No cervical lymphadenopathy  Cardiovascular:  Regular rate and rhythm, no murmurs  Thorax & Lungs:  Normal breath sounds, no rhonchi  Abdomen:  Soft, Non-tender  Skin:.  no rash  Extremities:   no edema.   Vascular:  symmetric radial pulse  Neurologic:  Normal gross motor. Good  bilaterally.      LABS  Labs Reviewed   URINE DRUG SCREEN - Abnormal; Notable for the following:        Result Value    Benzodiazepines Positive (*)     Cannabinoid Metab Positive (*)     All other components within normal limits   DIAGNOSTIC ALCOHOL - Abnormal; Notable for the following:     Diagnostic Alcohol 0.33 (*)     All other components within normal limits   CBC WITH DIFFERENTIAL - Abnormal; Notable for the following:     MCH 33.3 (*)     MCHC 35.3 (*)     Lymphocytes 41.30 (*)     All other components within normal limits   COMP METABOLIC PANEL - Abnormal; Notable for the following:     Co2 19 (*)     Anion Gap 13.0 (*)     AST(SGOT) 50 (*)     All other components within normal limits   SALICYLATE - Abnormal; Notable for the following:     Salicylates, Quant. 0 (*)     All other components within normal limits   HCG QUAL SERUM   ACETAMINOPHEN   ESTIMATED GFR   POC BREATHALIZER   All labs reviewed by " me.      COURSE & MEDICAL DECISION MAKING  Pertinent Labs & Imaging studies reviewed. (See chart for details)    11:29 AM - Patient seen and examined at bedside.  Patient will be treated with benadryl 25 mg, haldol 3 mg, and ativan 2 mg. Ordered Urine Drug Screen, POC Breathalizer, Blood alcohol, CBC, CMP, HCG Qual Serum, Acetaminophen level, Salicyclate level, and Estimated GFR to evaluate her symptoms.    11:31 AM Patient is agitated and non-cooperative and is being restrained by security.    11:51 AM Received and reviewed patient's lab results. Alcohol level of 0.33 and is breathing fine.    1:45 PM Patient is alert and oriented. She is resting comfortably at bedside and is no longer agitated and non-cooperative. Patient will be evaluated by life skills when clinically sober.       Medical Decision Making:  Patient was combative pulling her restraints and had to be chemically sedated for her own protection. After a period of observation I did recheck her approximately 2 hours after her medications she is sleeping easily aroused does not recall the event is cooperative and is gradually being taken out of her restraints. Her labs do not show any significant abnormality except alcohol intoxication. Due to the possible suicide attempt she will be evaluated by psychiatry when sober.    If found to be capable of discharge she will be discharged with alcohol abuse information precautions and referral follow-up.      DISPOSITION:  Patient will be transferred to an in state psychiatric facility in guarded condition.         FINAL IMPRESSION  1. Alcoholic intoxication without complication (HCC)    2. Reactive depression          Yue NAIR (Mckinley), am scribing for, and in the presence of, Nura Santoyo M.D..  Electronically signed by: Yue Mcdowell (Mckinley), 6/24/2018  Nura NAIR M.D. personally performed the services described in this documentation, as scribed by Yue Mcdowell in my presence,  and it is both accurate and complete.    The note accurately reflects work and decisions made by me.  Nura Santoyo  6/24/2018  3:05 PM

## 2018-06-24 NOTE — ED TRIAGE NOTES
Isa Bowser 47 y.o. female who presents to ED from home via EMS for chief complaint of Legal 2000 (SI with plan to stab self and burn house down, placed on hold by officer, overdose on ambien)    Approximately 20 ambien taken from bottle filled 6/4/2018 and only 2 remain in bottle. Patient admitted to law enforcement that she had been drinking as well. Patient had called boyfriend with plan to stab herself and burn the house down, and boyfriend called 911.    Patient is drowsy, respirations even and ulabored, no acute distress noted.   Stretcher low, wheels locked, call light within reach. Placed on continuous cardiac, blood pressure, and pulse oximetry monitoring.

## 2018-06-24 NOTE — DISCHARGE INSTRUCTIONS
Alcohol Problems  Most adults who drink alcohol drink in moderation (not a lot) are at low risk for developing problems related to their drinking. However, all drinkers, including low-risk drinkers, should know about the health risks connected with drinking alcohol.  RECOMMENDATIONS FOR LOW-RISK DRINKING   Drink in moderation. Moderate drinking is defined as follows:   · Men - no more than 2 drinks per day.  · Nonpregnant women - no more than 1 drink per day.  · Over age 65 - no more than 1 drink per day.  A standard drink is 12 grams of pure alcohol, which is equal to a 12 ounce bottle of beer or wine cooler, a 5 ounce glass of wine, or 1.5 ounces of distilled spirits (such as whiskey, gabriela, vodka, or rum).   ABSTAIN FROM (DO NOT DRINK) ALCOHOL:  · When pregnant or considering pregnancy.  · When taking a medication that interacts with alcohol.  · If you are alcohol dependent.  · A medical condition that prohibits drinking alcohol (such as ulcer, liver disease, or heart disease).  DISCUSS WITH YOUR CAREGIVER:  · If you are at risk for coronary heart disease, discuss the potential benefits and risks of alcohol use: Light to moderate drinking is associated with lower rates of coronary heart disease in certain populations (for example, men over age 45 and postmenopausal women). Infrequent or nondrinkers are advised not to begin light to moderate drinking to reduce the risk of coronary heart disease so as to avoid creating an alcohol-related problem. Similar protective effects can likely be gained through proper diet and exercise.  · Women and the elderly have smaller amounts of body water than men. As a result women and the elderly achieve a higher blood alcohol concentration after drinking the same amount of alcohol.  · Exposing a fetus to alcohol can cause a broad range of birth defects referred to as Fetal Alcohol Syndrome (FAS) or Alcohol-Related Birth Defects (ARBD). Although FAS/ARBD is connected with excessive  alcohol consumption during pregnancy, studies also have reported neurobehavioral problems in infants born to mothers reporting drinking an average of 1 drink per day during pregnancy.  · Heavier drinking (the consumption of more than 4 drinks per occasion by men and more than 3 drinks per occasion by women) impairs learning (cognitive) and psychomotor functions and increases the risk of alcohol-related problems, including accidents and injuries.  CAGE QUESTIONS:   · Have you ever felt that you should Cut down on your drinking?  · Have people Annoyed you by criticizing your drinking?  · Have you ever felt bad or Guilty about your drinking?  · Have you ever had a drink first thing in the morning to steady your nerves or get rid of a hangover (Eye opener)?  If you answered positively to any of these questions: You may be at risk for alcohol-related problems if alcohol consumption is:   · Men: Greater than 14 drinks per week or more than 4 drinks per occasion.  · Women: Greater than 7 drinks per week or more than 3 drinks per occasion.  Do you or your family have a medical history of alcohol-related problems, such as:  · Blackouts.  · Sexual dysfunction.  · Depression.  · Trauma.  · Liver dysfunction.  · Sleep disorders.  · Hypertension.  · Chronic abdominal pain.  · Has your drinking ever caused you problems, such as problems with your family, problems with your work (or school) performance, or accidents/injuries?  · Do you have a compulsion to drink or a preoccupation with drinking?  · Do you have poor control or are you unable to stop drinking once you have started?  · Do you have to drink to avoid withdrawal symptoms?  · Do you have problems with withdrawal such as tremors, nausea, sweats, or mood disturbances?  · Does it take more alcohol than in the past to get you high?  · Do you feel a strong urge to drink?  · Do you change your plans so that you can have a drink?  · Do you ever drink in the morning to relieve  the shakes or a hangover?  If you have answered a number of the previous questions positively, it may be time for you to talk to your caregivers, family, and friends and see if they think you have a problem. Alcoholism is a chemical dependency that keeps getting worse and will eventually destroy your health and relationships. Many alcoholics end up dead, impoverished, or in long term. This is often the end result of all chemical dependency.  · Do not be discouraged if you are not ready to take action immediately.  · Decisions to change behavior often involve up and down desires to change and feeling like you cannot decide.  · Try to think more seriously about your drinking behavior.  · Think of the reasons to quit.  WHERE TO GO FOR ADDITIONAL INFORMATION   · The National [place] on Alcohol Abuse and Alcoholism (NIAAA)  www.niaaa.nih.gov  · National [place] on Alcoholism and Drug Dependence (NCADD)  www.ncadd.org  · American Society of Addiction Medicine (ASAM)  www.asam.org   Document Released: 12/18/2006 Document Revised: 03/11/2013 Document Reviewed: 08/05/2009  ExitCare® Patient Information ©2014 [employer].    Depression, Adult  Depression refers to feeling sad, low, down in the dumps, blue, gloomy, or empty. In general, there are two kinds of depression:  1. Normal sadness or normal grief. This kind of depression is one that we all feel from time to time after upsetting life experiences, such as the loss of a job or the ending of a relationship. This kind of depression is considered normal, is short lived, and resolves within a few days to 2 weeks. Depression experienced after the loss of a loved one (bereavement) often lasts longer than 2 weeks but normally gets better with time.  2. Clinical depression. This kind of depression lasts longer than normal sadness or normal grief or interferes with your ability to function at home, at work, and in school. It also interferes with your personal relationships. It  affects almost every aspect of your life. Clinical depression is an illness.  Symptoms of depression can also be caused by conditions other than those mentioned above, such as:  · Physical illness. Some physical illnesses, including underactive thyroid gland (hypothyroidism), severe anemia, specific types of cancer, diabetes, uncontrolled seizures, heart and lung problems, strokes, and chronic pain are commonly associated with symptoms of depression.  · Side effects of some prescription medicine. In some people, certain types of medicine can cause symptoms of depression.  · Substance abuse. Abuse of alcohol and illicit drugs can cause symptoms of depression.  SYMPTOMS  Symptoms of normal sadness and normal grief include the following:  · Feeling sad or crying for short periods of time.  · Not caring about anything (apathy).  · Difficulty sleeping or sleeping too much.  · No longer able to enjoy the things you used to enjoy.  · Desire to be by oneself all the time (social isolation).  · Lack of energy or motivation.  · Difficulty concentrating or remembering.  · Change in appetite or weight.  · Restlessness or agitation.  Symptoms of clinical depression include the same symptoms of normal sadness or normal grief and also the following symptoms:  · Feeling sad or crying all the time.  · Feelings of guilt or worthlessness.  · Feelings of hopelessness or helplessness.  · Thoughts of suicide or the desire to harm yourself (suicidal ideation).  · Loss of touch with reality (psychotic symptoms). Seeing or hearing things that are not real (hallucinations) or having false beliefs about your life or the people around you (delusions and paranoia).  DIAGNOSIS   The diagnosis of clinical depression is usually based on how bad the symptoms are and how long they have lasted. Your health care provider will also ask you questions about your medical history and substance use to find out if physical illness, use of prescription  medicine, or substance abuse is causing your depression. Your health care provider may also order blood tests.  TREATMENT   Often, normal sadness and normal grief do not require treatment. However, sometimes antidepressant medicine is given for bereavement to ease the depressive symptoms until they resolve.  The treatment for clinical depression depends on how bad the symptoms are but often includes antidepressant medicine, counseling with a mental health professional, or both. Your health care provider will help to determine what treatment is best for you.  Depression caused by physical illness usually goes away with appropriate medical treatment of the illness. If prescription medicine is causing depression, talk with your health care provider about stopping the medicine, decreasing the dose, or changing to another medicine.  Depression caused by the abuse of alcohol or illicit drugs goes away when you stop using these substances. Some adults need professional help in order to stop drinking or using drugs.  SEEK IMMEDIATE MEDICAL CARE IF:  · You have thoughts about hurting yourself or others.  · You lose touch with reality (have psychotic symptoms).  · You are taking medicine for depression and have a serious side effect.  FOR MORE INFORMATION  · National [place] on Mental Illness: www.lionel.org   · National [place] of Mental Health: www.nimh.nih.gov      This information is not intended to replace advice given to you by your health care provider. Make sure you discuss any questions you have with your health care provider.     Document Released: 12/15/2001 Document Revised: 01/08/2016 Document Reviewed: 03/18/2013  MEDSEEK Interactive Patient Education ©2016 [employer]

## 2018-06-24 NOTE — ED NOTES
Patient resting on stretcher, chest rise and fall observed, remains on continuous monitoring, no acute distress noted.

## 2018-06-24 NOTE — ED NOTES
Patient's daughter, Emi, called Emergency Department requesting an update, advised that due to privacy laws, no information over the phone, however patient was stable, and being evaluated by ERP and we would continue to monitor and assess for changes in condition.

## 2018-06-24 NOTE — ED NOTES
Patient resting on stretcher, chest rise and fall observed, remains on continuous monitoring at this time.

## 2018-06-24 NOTE — ED NOTES
Alma, [employer] at bedside, patient uncooperative with staff who are attempting to explain Legal Hold process to patient. Security at bedside for belongings search.

## 2018-06-24 NOTE — ED NOTES
"Patient agitated, irritable, drinking water and demanding to leave, states \"I am going to leave, you can't stop me\" and attempting to fight staff in order to leave. RAFFAELE from [facility] for 1mg ativan IVP x 1 dose and 25mg benadryl IVP x 1 dose.   "

## 2018-06-25 ENCOUNTER — HOSPITAL ENCOUNTER (OUTPATIENT)
Dept: RADIOLOGY | Facility: MEDICAL CENTER | Age: 48
End: 2018-06-25
Attending: OBSTETRICS & GYNECOLOGY
Payer: MEDICAID

## 2018-06-25 DIAGNOSIS — D25.9 UTERINE LEIOMYOMA, UNSPECIFIED LOCATION: ICD-10-CM

## 2018-06-25 DIAGNOSIS — R10.2 PELVIC PAIN: ICD-10-CM

## 2018-06-25 DIAGNOSIS — N92.5 RETROGRADE MENSTRUATION: ICD-10-CM

## 2018-06-25 PROCEDURE — 76830 TRANSVAGINAL US NON-OB: CPT

## 2018-06-25 NOTE — ED NOTES
Pt ambulatory  Vital signs stable  Pt handed d/c paperwork with understanding stated  Pt states will follow up with detox center  Pt handed prescriptions and states safe way home

## 2018-06-25 NOTE — ED PROVIDER NOTES
"ED Provider Note    I assumed care of this patient at shift change.  Patient was being observed for clinical sobriety and assessment by behavioral health.  The patient has been abusing alcohol heavily.  She is also been somewhat abusing her Ambien due to multiple recent life stressors.  Patient voices no suicidal ideation no homicidal ideation she is currently stable.  Boyfriend verifies that he does not feel as though she is a threat to herself but both the patient and her boyfriend are fed up with her irresponsible drinking/alcohol dependence.  Patient does want to seek treatment for this.  She was seen by behavioral health who have arranged for the patient to present to [facility] tomorrow morning.  Patient is stable at this time medically cleared.  Agrees on non-harm.  She demonstrates capacity and forward thinking.  She will be given 6 Valium 5 mg tabs for alcohol withdrawal to get her through to the detox center.  Return for worsening symptoms or concerns otherwise discharged home in stable condition.    /74   Pulse 82   Temp 37.1 °C (98.8 °F)   Resp 15   Ht 1.676 m (5' 6\")   Wt 54.4 kg (120 lb)   SpO2 93%   BMI 19.37 kg/m²     In prescribing controlled substances to this patient, I certify that I have obtained and reviewed the medical history of Isa Bowser. I have also made a good zahraa effort to obtain applicable records from other providers who have treated the patient and records did not demonstrate any increased risk of substance abuse that would prevent me from prescribing controlled substances.     I have conducted a physical exam and documented it. I have reviewed Ms. Bowser’s prescription history as maintained by the [place] Prescription Monitoring Program.     I have assessed the patient’s risk for abuse, dependency, and addiction using the validated Opioid Risk Tool available at https://www.mdcalc.com/ncukhr-trbs-efxh-ort-narcotic-abuse.     Given " the above, I believe the benefits of controlled substance therapy outweigh the risks. The reasons for prescribing controlled substances include in my professional opinion, controlled substances are the only reasonable choice for this patient because Alcohol dependence and alcohol withdrawal necessitating benzodiazepine. Accordingly, I have discussed the risk and benefits, treatment plan, and alternative therapies with the patient.       Impression:  1.  Alcohol intoxication  2.  Alcohol withdrawal  3.  Reactive depression

## 2018-06-25 NOTE — ED NOTES
Patient resting on stretcher, wheels locked, stretcher low, remains on continuous cardiac, blood pressure and pulse oximetry monitoring. No acute distress noted. Will continue to monitor and observe for changes in condition or additional needs.
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
## 2018-06-25 NOTE — ED NOTES
Patient's boyfriend, Ivan, can be reached at [phone] and requests an update from [employer]. He states patient has difficulty remembering her medical conditions and that he often has to help her at doctor's appointments.   Advised him that due to privacy laws we could not provide information over the phone. Verbalized understanding, denied any additional questions or concerns.

## 2018-07-12 ENCOUNTER — HOSPITAL ENCOUNTER (OUTPATIENT)
Dept: RADIOLOGY | Facility: MEDICAL CENTER | Age: 48
DRG: 743 | End: 2018-07-12
Attending: OBSTETRICS & GYNECOLOGY | Admitting: OBSTETRICS & GYNECOLOGY
Payer: MEDICAID

## 2018-07-12 DIAGNOSIS — Z01.812 PRE-OPERATIVE LABORATORY EXAMINATION: ICD-10-CM

## 2018-07-12 DIAGNOSIS — Z01.810 PRE-OPERATIVE CARDIOVASCULAR EXAMINATION: ICD-10-CM

## 2018-07-12 DIAGNOSIS — Z01.811 PRE-OPERATIVE RESPIRATORY EXAMINATION: ICD-10-CM

## 2018-07-12 LAB
ALBUMIN SERPL BCP-MCNC: 4.4 G/DL (ref 3.2–4.9)
ALBUMIN/GLOB SERPL: 1.9 G/DL
ALP SERPL-CCNC: 55 U/L (ref 30–99)
ALT SERPL-CCNC: 25 U/L (ref 2–50)
ANION GAP SERPL CALC-SCNC: 7 MMOL/L (ref 0–11.9)
AST SERPL-CCNC: 22 U/L (ref 12–45)
BASOPHILS # BLD AUTO: 0.9 % (ref 0–1.8)
BASOPHILS # BLD: 0.05 K/UL (ref 0–0.12)
BILIRUB SERPL-MCNC: 0.5 MG/DL (ref 0.1–1.5)
BUN SERPL-MCNC: 25 MG/DL (ref 8–22)
CALCIUM SERPL-MCNC: 9.4 MG/DL (ref 8.5–10.5)
CHLORIDE SERPL-SCNC: 103 MMOL/L (ref 96–112)
CO2 SERPL-SCNC: 26 MMOL/L (ref 20–33)
CREAT SERPL-MCNC: 0.79 MG/DL (ref 0.5–1.4)
EKG IMPRESSION: NORMAL
EOSINOPHIL # BLD AUTO: 0.05 K/UL (ref 0–0.51)
EOSINOPHIL NFR BLD: 0.9 % (ref 0–6.9)
ERYTHROCYTE [DISTWIDTH] IN BLOOD BY AUTOMATED COUNT: 42.3 FL (ref 35.9–50)
GLOBULIN SER CALC-MCNC: 2.3 G/DL (ref 1.9–3.5)
GLUCOSE SERPL-MCNC: 82 MG/DL (ref 65–99)
HCG SERPL QL: NEGATIVE
HCT VFR BLD AUTO: 39 % (ref 37–47)
HGB BLD-MCNC: 13.2 G/DL (ref 12–16)
LG PLATELETS BLD QL SMEAR: NORMAL
LYMPHOCYTES # BLD AUTO: 2.32 K/UL (ref 1–4.8)
LYMPHOCYTES NFR BLD: 43.8 % (ref 22–41)
MANUAL DIFF BLD: NORMAL
MCH RBC QN AUTO: 33 PG (ref 27–33)
MCHC RBC AUTO-ENTMCNC: 33.8 G/DL (ref 33.6–35)
MCV RBC AUTO: 97.5 FL (ref 81.4–97.8)
MONOCYTES # BLD AUTO: 0.37 K/UL (ref 0–0.85)
MONOCYTES NFR BLD AUTO: 7 % (ref 0–13.4)
MORPHOLOGY BLD-IMP: NORMAL
NEUTROPHILS # BLD AUTO: 2.51 K/UL (ref 2–7.15)
NEUTROPHILS NFR BLD: 47.4 % (ref 44–72)
NRBC # BLD AUTO: 0 K/UL
NRBC BLD-RTO: 0 /100 WBC
OVALOCYTES BLD QL SMEAR: NORMAL
PLATELET # BLD AUTO: 222 K/UL (ref 164–446)
PLATELET BLD QL SMEAR: NORMAL
PMV BLD AUTO: 11.4 FL (ref 9–12.9)
POIKILOCYTOSIS BLD QL SMEAR: NORMAL
POTASSIUM SERPL-SCNC: 3.6 MMOL/L (ref 3.6–5.5)
PROT SERPL-MCNC: 6.7 G/DL (ref 6–8.2)
RBC # BLD AUTO: 4 M/UL (ref 4.2–5.4)
RBC BLD AUTO: PRESENT
SODIUM SERPL-SCNC: 136 MMOL/L (ref 135–145)
WBC # BLD AUTO: 5.3 K/UL (ref 4.8–10.8)

## 2018-07-12 PROCEDURE — 93005 ELECTROCARDIOGRAM TRACING: CPT

## 2018-07-12 PROCEDURE — 80053 COMPREHEN METABOLIC PANEL: CPT

## 2018-07-12 PROCEDURE — 85007 BL SMEAR W/DIFF WBC COUNT: CPT

## 2018-07-12 PROCEDURE — 85027 COMPLETE CBC AUTOMATED: CPT

## 2018-07-12 PROCEDURE — 36415 COLL VENOUS BLD VENIPUNCTURE: CPT

## 2018-07-12 PROCEDURE — 93010 ELECTROCARDIOGRAM REPORT: CPT | Performed by: INTERNAL MEDICINE

## 2018-07-12 PROCEDURE — 71045 X-RAY EXAM CHEST 1 VIEW: CPT

## 2018-07-12 PROCEDURE — 84703 CHORIONIC GONADOTROPIN ASSAY: CPT

## 2018-07-17 NOTE — H&P
HISTORY OF PRESENT ILLNESS:  The patient is a 47-year-old white female    5, para 2, whose chief complaint is heavy abnormal menstrual bleeding and   abdominal pain.  The patient complains of nausea, vomiting, and severe lower   abdominal pain.    PAST MEDICAL HISTORY:  The patient's past medical history is significant for   drug abuse.  She has a history of methamphetamine use, hepatitis C, gastritis,   constipation, anxiety, fibroid uterus, a history of a left dermoid cyst, and   alcohol abuse.    PAST SURGICAL HISTORY:  The patient is status post laparoscopic left   salpingo-oophorectomy.  She is status post cholecystectomy and status post   tubal ligation.    CURRENT MEDICATIONS:  Rozerem 8 mg 1 tab orally at bedtime, Colace 100 mg 1   tab p.o. daily, Invega 6 mg oral tablets, 1 tab orally daily, MiraLax powder   17 g daily, bupropion 150 mg extended release 1 tab orally daily, citalopram   40 mg 1 tablet orally daily, pantoprazole 40 mg 1 tablet orally daily,   propranolol 10 mg 1 tab orally daily, ProAir 90 mcg 2 puffs daily q. 4-6   hours, Zofran 4 mg 1 tab q. 8 hours, promethazine 12.5 mg p.r.n. nausea and   vomiting.    ALLERGIES:  No known allergies.    SOCIAL HISTORY:  Habits:  The patient smokes one-half pack of cigarettes and   has done so for 30 years.  She does not partake of alcoholic beverages.  The   patient has been treated for drug abuse in the past.  She states that she is   currently not using recreational drugs.  Marital status:  Single.  Education:    High school.    FAMILY HISTORY:  The patient's father is  at age 65 secondary to   hepatitis C.  Her mother is 64 years of age and in good health.    REVIEW OF SYSTEMS:  CONSTITUTIONAL:  The patient denies any fever, chills or sweats.  She   complains of weight loss, weakness, and fatigue.  EYES:  No change in vision.  EAR, NOSE, AND THROAT:  No nosebleed, sore throat, or dry mouth.  CARDIOVASCULAR:  The patient complains of  dizziness, but no shortness of   breath, chest pain, loss of consciousness, or heart palpitations.  RESPIRATORY:  The patient denies any chest pain, cough, wheezing or shortness   of breath.  GASTROINTESTINAL:  She complains of abdominal pain, nausea, vomiting, change   in bowel habits, constipation and diarrhea.  HEMATOLOGICAL:  She denies any swollen lymph glands or easy bruisability.  GYNECOLOGIC:  The patient complains of pelvic pain.  She denies any bleeding   with intercourse, unusual vaginal discharge or odor, vulvar or vaginal itching   or burning.  URINARY:  She complains of difficulty emptying her bladder, occasional leakage   of urine, and nocturia.  She denies any painful urination, frequent   urination, or urgency.  MUSCULOSKELETAL:  She denies any back pain, weakness, joint pains, joint   stiffness, or joint swelling.  INTEGUMENT:  She complains of hair loss.  She denies any breast lumps, nipple   discharge, or breast pain.  NEUROLOGICAL:  She complains of depression, anxiety, and mood swings.  ENDOCRINE:  The patient complains of cold intolerance, sleep disturbances.    She denies any excessive thirst, excessive urination, tremor, hot flashes or   night sweats.    PHYSICAL EXAMINATION:  VITAL SIGNS:  The patient is 61 inches tall, 116 pounds, BMI 21.9, blood   pressure 110/80, respirations 20, pulse 79, O2 sat 98% on room air.  GENERAL:  This is a normal-appearing white female.  HEENT:  Normocephalic without sign of trauma.  SKIN:  No rashes, changes, or cyanosis.  NECK:  Supple.  Full range of motion.  Trachea midline.  LUNGS:  Clear to auscultation and percussion.  HEART:  S1, S2 is normal.  No S3, S4.  No lifts, rubs or heaves.  BREASTS:  No lumps, masses, nipple discharge, supraclavicular adenopathy.  ABDOMEN:  Flat.  Bowel sounds positive.  No hepatomegaly.  No splenomegaly.    No tenderness, guarding, rigidity or rebound.  MUSCULOSKELETAL:  No joint pain, weakness or tenderness.  GENITALIA:   Female escutcheon.  Bartholin, urethral and Burtrum's glands are   normal.  Lymphatic:  Groin nodes not enlarged.  Urethra:  No masses,   tenderness, or scarring.  Vagina:  No lesions.  Vaginal estrogen effect is   present.  Bladder:  No fullness, masses, or tenderness.  Cystocele absent.    Cervix nontender to motion.  Uterus normal in size.  Rectocele absent.    LABORATORY DATA:  Complete blood count:  WBC is 5.3 K/uL, hemoglobin 13.2   g/dL, hematocrit 39.0.  Chemistry profile within normal limits with the   exception of BUN 25 mg/dL, creatinine 0.79, GFR was greater than 60 mL per   minute/1.73.  Liver function tests, aspartate aminotransferase 22 units per   liter, alanine aminotransferase 25 units per liter, hCG negative.    IMAGING:  Chest x-ray, no acute cardiopulmonary disease.  Transvaginal pelvic   ultrasound performed 06/25/2018, uterus measures 5.76x8.32x6.19, uterine   myometrium is inhomogeneous, endometrial echo complex measures 0.89 cm.  The   right ovary measures 5.46x3.13x3.33 cm.  Multiple cystic lesions are present,   lesions measuring 1.7 cm, shows internal echoes, additional lesions show   apparent septations measuring 3.1x1.8x2.8 cm.  Left ovary is not visualized.    Impression:  Multiple complex right ovarian complex cysts measuring up to 3.1   cm.  No pelvic evidence of right ovarian torsion.  Left ovary not visualized.    Endometrial biopsy revealed benign endocervical epithelium only, no   endometrial tissue.    IMPRESSIONS:  1.  Right ovarian cyst with septations -- assessed right dermoid cyst.  2.  Pelvic pain.  3.  History of hepatitis C.  4.  History of methamphetamine abuse.  5.  History of alcohol abuse.  6.  Tobacco abuse.  7.  Anxiety.  8.  Depression.  9.  Gastritis.  10.  Status post tubal ligation.    RECOMMENDATION:  I have had a long thorough discussion with the patient   regarding risks, benefits, and alternatives to surgery.  We discussed   conservative care, observation.   The patient stated that her pain was too   great.  She wishes removal of her right ovary and Fallopian tubeas well as her uterus.  We discussed   some serious and significant risks to include but not limited to the risk of   anesthesia, infection, bleeding, injury to the bowel, bladder, ureter, pelvic   vessels, deep vein thrombosis, pulmonary emboli, and even death.  Informed   consent was received.  All the patient's questions were answered to her   satisfaction and informed consent form was read and signed after a thorough   discussion of the risks, benefits, and alternatives.  We discussed   postoperative care.  A copy of the informed consent form was given to the   patient.       ____________________________________     MD MARLA CHILDRESS / MARLIN    DD:  07/17/2018 11:07:49  DT:  07/17/2018 11:47:06    D#:  [id number]  Job#:  [id number]    cc: ANÍBAL NOLASCO MD

## 2018-07-19 ENCOUNTER — HOSPITAL ENCOUNTER (INPATIENT)
Facility: MEDICAL CENTER | Age: 48
LOS: 1 days | DRG: 743 | End: 2018-07-20
Attending: OBSTETRICS & GYNECOLOGY | Admitting: OBSTETRICS & GYNECOLOGY
Payer: MEDICAID

## 2018-07-19 PROBLEM — N83.00 FOLLICULAR CYST OF OVARY: Status: RESOLVED | Noted: 2017-04-12 | Resolved: 2018-07-19

## 2018-07-19 LAB — HCG SERPL QL: NEGATIVE

## 2018-07-19 PROCEDURE — 84703 CHORIONIC GONADOTROPIN ASSAY: CPT

## 2018-07-19 PROCEDURE — 0TJB8ZZ INSPECTION OF BLADDER, VIA NATURAL OR ARTIFICIAL OPENING ENDOSCOPIC: ICD-10-PCS | Performed by: OBSTETRICS & GYNECOLOGY

## 2018-07-19 PROCEDURE — 700111 HCHG RX REV CODE 636 W/ 250 OVERRIDE (IP): Performed by: OBSTETRICS & GYNECOLOGY

## 2018-07-19 PROCEDURE — 500886 HCHG PACK, LAPAROSCOPY: Performed by: OBSTETRICS & GYNECOLOGY

## 2018-07-19 PROCEDURE — 501330 HCHG SET, CYSTO IRRIG TUBING: Performed by: OBSTETRICS & GYNECOLOGY

## 2018-07-19 PROCEDURE — 501411 HCHG SPONGE, BABY LAP W/O RINGS: Performed by: OBSTETRICS & GYNECOLOGY

## 2018-07-19 PROCEDURE — 502587 HCHG PACK, D&C: Performed by: OBSTETRICS & GYNECOLOGY

## 2018-07-19 PROCEDURE — 160029 HCHG SURGERY MINUTES - 1ST 30 MINS LEVEL 4: Performed by: OBSTETRICS & GYNECOLOGY

## 2018-07-19 PROCEDURE — 160041 HCHG SURGERY MINUTES - EA ADDL 1 MIN LEVEL 4: Performed by: OBSTETRICS & GYNECOLOGY

## 2018-07-19 PROCEDURE — 700102 HCHG RX REV CODE 250 W/ 637 OVERRIDE(OP): Performed by: OBSTETRICS & GYNECOLOGY

## 2018-07-19 PROCEDURE — A4338 INDWELLING CATHETER LATEX: HCPCS | Performed by: OBSTETRICS & GYNECOLOGY

## 2018-07-19 PROCEDURE — 500447 HCHG DRESSING, TEGADERM 8X12: Performed by: OBSTETRICS & GYNECOLOGY

## 2018-07-19 PROCEDURE — 501838 HCHG SUTURE GENERAL: Performed by: OBSTETRICS & GYNECOLOGY

## 2018-07-19 PROCEDURE — 0UT5FZZ RESECTION OF RIGHT FALLOPIAN TUBE, VIA NATURAL OR ARTIFICIAL OPENING WITH PERCUTANEOUS ENDOSCOPIC ASSISTANCE: ICD-10-PCS | Performed by: OBSTETRICS & GYNECOLOGY

## 2018-07-19 PROCEDURE — 160002 HCHG RECOVERY MINUTES (STAT): Performed by: OBSTETRICS & GYNECOLOGY

## 2018-07-19 PROCEDURE — 160035 HCHG PACU - 1ST 60 MINS PHASE I: Performed by: OBSTETRICS & GYNECOLOGY

## 2018-07-19 PROCEDURE — 0UT0FZZ RESECTION OF RIGHT OVARY, VIA NATURAL OR ARTIFICIAL OPENING WITH PERCUTANEOUS ENDOSCOPIC ASSISTANCE: ICD-10-PCS | Performed by: OBSTETRICS & GYNECOLOGY

## 2018-07-19 PROCEDURE — 501579 HCHG TROCAR, STEP 5MM: Performed by: OBSTETRICS & GYNECOLOGY

## 2018-07-19 PROCEDURE — 700111 HCHG RX REV CODE 636 W/ 250 OVERRIDE (IP)

## 2018-07-19 PROCEDURE — 500432 HCHG DRESSING, KLING 3: Performed by: OBSTETRICS & GYNECOLOGY

## 2018-07-19 PROCEDURE — 700102 HCHG RX REV CODE 250 W/ 637 OVERRIDE(OP)

## 2018-07-19 PROCEDURE — 160048 HCHG OR STATISTICAL LEVEL 1-5: Performed by: OBSTETRICS & GYNECOLOGY

## 2018-07-19 PROCEDURE — A9270 NON-COVERED ITEM OR SERVICE: HCPCS | Performed by: OBSTETRICS & GYNECOLOGY

## 2018-07-19 PROCEDURE — 502704 HCHG DEVICE, LIGASURE IMPACT: Performed by: OBSTETRICS & GYNECOLOGY

## 2018-07-19 PROCEDURE — A9270 NON-COVERED ITEM OR SERVICE: HCPCS

## 2018-07-19 PROCEDURE — 770006 HCHG ROOM/CARE - MED/SURG/GYN SEMI*

## 2018-07-19 PROCEDURE — 0UT9FZZ RESECTION OF UTERUS, VIA NATURAL OR ARTIFICIAL OPENING WITH PERCUTANEOUS ENDOSCOPIC ASSISTANCE: ICD-10-PCS | Performed by: OBSTETRICS & GYNECOLOGY

## 2018-07-19 PROCEDURE — 700104 HCHG RX REV CODE 254

## 2018-07-19 PROCEDURE — 500901 HCHG PACKING, VAG 2 X-RAY: Performed by: OBSTETRICS & GYNECOLOGY

## 2018-07-19 PROCEDURE — 160036 HCHG PACU - EA ADDL 30 MINS PHASE I: Performed by: OBSTETRICS & GYNECOLOGY

## 2018-07-19 PROCEDURE — 88307 TISSUE EXAM BY PATHOLOGIST: CPT

## 2018-07-19 PROCEDURE — 500854 HCHG NEEDLE, INSUFFLATION FOR STEP: Performed by: OBSTETRICS & GYNECOLOGY

## 2018-07-19 PROCEDURE — 0UTC7ZZ RESECTION OF CERVIX, VIA NATURAL OR ARTIFICIAL OPENING: ICD-10-PCS | Performed by: OBSTETRICS & GYNECOLOGY

## 2018-07-19 PROCEDURE — 160009 HCHG ANES TIME/MIN: Performed by: OBSTETRICS & GYNECOLOGY

## 2018-07-19 PROCEDURE — 501577 HCHG TROCAR, STEP 11MM: Performed by: OBSTETRICS & GYNECOLOGY

## 2018-07-19 PROCEDURE — 700101 HCHG RX REV CODE 250

## 2018-07-19 RX ORDER — DIAZEPAM 5 MG/1
5 TABLET ORAL EVERY 6 HOURS PRN
COMMUNITY

## 2018-07-19 RX ORDER — SIMETHICONE 80 MG
80 TABLET,CHEWABLE ORAL EVERY 8 HOURS PRN
Status: DISCONTINUED | OUTPATIENT
Start: 2018-07-19 | End: 2018-07-20 | Stop reason: HOSPADM

## 2018-07-19 RX ORDER — MORPHINE SULFATE 4 MG/ML
4 INJECTION, SOLUTION INTRAMUSCULAR; INTRAVENOUS
Status: DISCONTINUED | OUTPATIENT
Start: 2018-07-19 | End: 2018-07-20 | Stop reason: HOSPADM

## 2018-07-19 RX ORDER — BUPIVACAINE HYDROCHLORIDE AND EPINEPHRINE 2.5; 5 MG/ML; UG/ML
INJECTION, SOLUTION EPIDURAL; INFILTRATION; INTRACAUDAL; PERINEURAL
Status: DISCONTINUED | OUTPATIENT
Start: 2018-07-19 | End: 2018-07-19 | Stop reason: HOSPADM

## 2018-07-19 RX ORDER — SODIUM CHLORIDE, SODIUM LACTATE, POTASSIUM CHLORIDE, CALCIUM CHLORIDE 600; 310; 30; 20 MG/100ML; MG/100ML; MG/100ML; MG/100ML
INJECTION, SOLUTION INTRAVENOUS CONTINUOUS
Status: DISCONTINUED | OUTPATIENT
Start: 2018-07-19 | End: 2018-07-20 | Stop reason: HOSPADM

## 2018-07-19 RX ORDER — KETOROLAC TROMETHAMINE 30 MG/ML
INJECTION, SOLUTION INTRAMUSCULAR; INTRAVENOUS
Status: COMPLETED
Start: 2018-07-19 | End: 2018-07-19

## 2018-07-19 RX ORDER — OXYCODONE HYDROCHLORIDE 5 MG/1
5 TABLET ORAL
Status: DISCONTINUED | OUTPATIENT
Start: 2018-07-19 | End: 2018-07-20

## 2018-07-19 RX ORDER — MIDAZOLAM HYDROCHLORIDE 1 MG/ML
INJECTION INTRAMUSCULAR; INTRAVENOUS
Status: COMPLETED
Start: 2018-07-19 | End: 2018-07-19

## 2018-07-19 RX ORDER — OXYCODONE HYDROCHLORIDE 10 MG/1
10 TABLET ORAL
Status: DISCONTINUED | OUTPATIENT
Start: 2018-07-19 | End: 2018-07-20

## 2018-07-19 RX ORDER — OXYCODONE HCL 5 MG/5 ML
SOLUTION, ORAL ORAL
Status: COMPLETED
Start: 2018-07-19 | End: 2018-07-19

## 2018-07-19 RX ORDER — ONDANSETRON 2 MG/ML
4 INJECTION INTRAMUSCULAR; INTRAVENOUS EVERY 6 HOURS PRN
Status: DISCONTINUED | OUTPATIENT
Start: 2018-07-19 | End: 2018-07-20 | Stop reason: HOSPADM

## 2018-07-19 RX ADMIN — OXYCODONE HYDROCHLORIDE 10 MG: 5 SOLUTION ORAL at 10:30

## 2018-07-19 RX ADMIN — MORPHINE SULFATE 4 MG: 4 INJECTION INTRAVENOUS at 23:27

## 2018-07-19 RX ADMIN — OXYCODONE HYDROCHLORIDE 10 MG: 10 TABLET ORAL at 13:21

## 2018-07-19 RX ADMIN — MIDAZOLAM 1 MG: 1 INJECTION INTRAMUSCULAR; INTRAVENOUS at 10:30

## 2018-07-19 RX ADMIN — OXYCODONE HYDROCHLORIDE 10 MG: 10 TABLET ORAL at 22:15

## 2018-07-19 RX ADMIN — OXYCODONE HYDROCHLORIDE 10 MG: 10 TABLET ORAL at 18:35

## 2018-07-19 RX ADMIN — FENTANYL CITRATE 50 MCG: 50 INJECTION, SOLUTION INTRAMUSCULAR; INTRAVENOUS at 09:45

## 2018-07-19 RX ADMIN — ESTRADIOL 1 PATCH: 0.1 PATCH TRANSDERMAL at 15:36

## 2018-07-19 RX ADMIN — SODIUM CHLORIDE, SODIUM LACTATE, POTASSIUM CHLORIDE, CALCIUM CHLORIDE: 600; 310; 30; 20 INJECTION, SOLUTION INTRAVENOUS at 06:30

## 2018-07-19 RX ADMIN — FENTANYL CITRATE 50 MCG: 50 INJECTION, SOLUTION INTRAMUSCULAR; INTRAVENOUS at 10:00

## 2018-07-19 RX ADMIN — MORPHINE SULFATE 4 MG: 4 INJECTION INTRAVENOUS at 14:46

## 2018-07-19 RX ADMIN — MIDAZOLAM 1 MG: 1 INJECTION INTRAMUSCULAR; INTRAVENOUS at 10:40

## 2018-07-19 RX ADMIN — KETOROLAC TROMETHAMINE 30 MG: 30 INJECTION, SOLUTION INTRAMUSCULAR at 12:30

## 2018-07-19 RX ADMIN — MORPHINE SULFATE 4 MG: 4 INJECTION INTRAVENOUS at 19:47

## 2018-07-19 ASSESSMENT — COGNITIVE AND FUNCTIONAL STATUS - GENERAL
SUGGESTED CMS G CODE MODIFIER DAILY ACTIVITY: CH
MOBILITY SCORE: 24
SUGGESTED CMS G CODE MODIFIER MOBILITY: CH
DAILY ACTIVITIY SCORE: 24

## 2018-07-19 ASSESSMENT — PAIN SCALES - GENERAL
PAINLEVEL_OUTOF10: 5
PAINLEVEL_OUTOF10: 5
PAINLEVEL_OUTOF10: 9
PAINLEVEL_OUTOF10: 7
PAINLEVEL_OUTOF10: 4
PAINLEVEL_OUTOF10: 9
PAINLEVEL_OUTOF10: 9
PAINLEVEL_OUTOF10: 6
PAINLEVEL_OUTOF10: 0
PAINLEVEL_OUTOF10: 0
PAINLEVEL_OUTOF10: 6
PAINLEVEL_OUTOF10: 4
PAINLEVEL_OUTOF10: 5
PAINLEVEL_OUTOF10: 6
PAINLEVEL_OUTOF10: 7
PAINLEVEL_OUTOF10: 8
PAINLEVEL_OUTOF10: 3
PAINLEVEL_OUTOF10: 6
PAINLEVEL_OUTOF10: 9
PAINLEVEL_OUTOF10: 6
PAINLEVEL_OUTOF10: 5
PAINLEVEL_OUTOF10: 0
PAINLEVEL_OUTOF10: 7
PAINLEVEL_OUTOF10: 8

## 2018-07-19 ASSESSMENT — LIFESTYLE VARIABLES
TOTAL SCORE: 4
ON A TYPICAL DAY WHEN YOU DRINK ALCOHOL HOW MANY DRINKS DO YOU HAVE: 2
TOTAL SCORE: 4
HAVE PEOPLE ANNOYED YOU BY CRITICIZING YOUR DRINKING: YES
TOTAL SCORE: 4
AVERAGE NUMBER OF DAYS PER WEEK YOU HAVE A DRINK CONTAINING ALCOHOL: 2
HOW MANY TIMES IN THE PAST YEAR HAVE YOU HAD 5 OR MORE DRINKS IN A DAY: 5
ALCOHOL_USE: YES
EVER_SMOKED: YES
EVER FELT BAD OR GUILTY ABOUT YOUR DRINKING: YES
CONSUMPTION TOTAL: POSITIVE
DOES PATIENT WANT TO STOP DRINKING: YES
EVER HAD A DRINK FIRST THING IN THE MORNING TO STEADY YOUR NERVES TO GET RID OF A HANGOVER: YES
DOES PATIENT WANT TO TALK TO SOMEONE ABOUT QUITTING: NO
HAVE YOU EVER FELT YOU SHOULD CUT DOWN ON YOUR DRINKING: YES

## 2018-07-19 ASSESSMENT — PATIENT HEALTH QUESTIONNAIRE - PHQ9
2. FEELING DOWN, DEPRESSED, IRRITABLE, OR HOPELESS: NOT AT ALL
SUM OF ALL RESPONSES TO PHQ9 QUESTIONS 1 AND 2: 0
1. LITTLE INTEREST OR PLEASURE IN DOING THINGS: NOT AT ALL

## 2018-07-19 NOTE — OP REPORT
DATE OF SERVICE:  07/19/2018    PREOPERATIVE DIAGNOSES:  Right ovarian cyst, pelvic pain, history of hepatitis   C.    POSTOPERATIVE DIAGNOSES:  Right ovarian cyst, possible dermoid, pelvic pain,   history of hepatitis C.    OPERATION:  Laparoscopically-assisted vaginal hysterectomy, right   salpingo-oophorectomy (previous left salpingo-oophorectomy), lysis of   adhesions, cystoscopy.    OPERATING DOCTOR:  Aryan Rossi MD    ASSISTANT:  Michelle Carmona MD    ANESTHESIOLOGIST:  Paul Og MD    ANESTHESIA:  General endotracheal anesthesia.    ANTIBIOTICS:  Sequential stockings in place.    ANTIBIOTICS:  Ancef 1 g IV piggyback.    DRAINS:  Bill catheter to gravity.    FINDINGS:  Exam under anesthesia revealed a uterus to be normal in size,   smooth, mobile, and no adnexal mass.  Diagnostic laparoscopy revealed a   bilobed ovarian cyst with septations.  There was no sign of pelvic   endometriosis.  Cystoscopy revealed bilateral jetting of indigo carmine from   the right ureter and left ureter.  There was no gross pathology of the   bladder.  The right upper quadrant was without gross pathology.    DESCRIPTION OF OPERATION:  The patient was taken to the operating room, placed   on the operating room table where general endotracheal anesthesia was   administered.  [facility]   lithotomy position.  A Bill catheter was placed.  A time-out was called.    The patient, the operation, her birthdate and allergies -- no known allergies   were identified.  Upon completion of the time-out, exam under anesthesia was   performed.  A weighted speculum was placed in the vagina.  The anterior lip of   the cervix was grasped with single tooth tenaculum.  The cervix was dilated   with Acuna dilators and the uterus was sounded to 4 cm.  An acorn cannula was   placed for uterine manipulation.  The weighted speculum was removed and the   legs were lowered.  The subumbilical  region was injected with 0.25% Marcaine   with epinephrine, a total of 6 mL, 5 cm above the symphysis pubis in the   midline, 4 mL of 0.25% Marcaine with epinephrine was injected.  A semilunar   incision was made under the umbilicus.  Through this incision, a Veress step   needle was inserted into the peritoneal cavity.  The drop test was positive.    The peritoneal cavity was insufflated with 2.8 liters of CO2.  The Veress   needle was removed and a #11 disposable step trocar was introduced.    Diagnostic laparoscopy was performed.  The right upper quadrant was without   gross pathology, there was a bilobed ovarian cyst.  The left tube and ovary   had been removed.  The right ureter was visualized, the left ureter was   visualized.  5 cm above the symphysis pubis in the midline, a 5 mm incision   was made and through this incision, a Veress step needle was introduced, the   needle was removed and a 5 mm trocar was introduced through which a Prestige   instrument was placed.  The right fallopian tube and ovary were elevated, the   right pelvic infundibular ligament was confirmed and the right ureter was   visualized.  Placing the LigaSure at 3 bars, the right pelvic infundibular   ligament was cauterized and lysed.  The right broad ligament was cauterized   and lysed.  Corpus tissue on the right side was cauterized and lysed to the   level below the right uterine vessels.  Attention was focused on the left   adnexa, there had been a previous left adnexectomy for dermoid cyst.  The left   uterine vessels were cauterized and lysed.  A bladder flap was developed   using sharp scissors, the bladder flap was advanced using the Nezhat    aspirator and sharp dissection.  There was good hemostasis.  A single pelvic   adhesion on the left lower adnexa was cauterized and lysed.  Under direct   vision, all instruments were removed and draped in the sterile fashion across   the abdomen.  The legs were elevated,  weighted speculum was placed in the   vagina.  The single tooth tenaculum and acorn cannula was removed.  Kasandra   tenaculums were placed on the cervix, a paracervical block with 0.25% Marcaine   with epinephrine 10 mL was performed.  Using the Bard Rodolfo #10 blade, the   cervix was circumscribed, the vagina was advanced using sharp dissection with   Prater scissors.  The posterior cul-de-sac was entered using sharp dissection   and a single suture of 2-0 Vicryl was placed in the posterior cul-de-sac.  A   long weighted Jay Auvard weighted speculum was placed.  The left   uterosacral ligament was grasped, divided and ligated with 0 Vicryl, this   suture was kept long.  The right uterosacral ligament was grasped, divided and   ligated with 0 Vicryl, this suture was kept long.  The left cardinal ligament   was grasped, divided and ligated with 0 Vicryl, this suture was cut short.    The same procedure was carried out on the right side, grasping peritoneum and   hugging the uterus, tissue was grasped, divided and ligated on the right side.    Same procedure was carried out on the left side.  The left remaining tissue   was grasped, divided and ligated with 0 Vicryl on the left side.  The   remaining tissue on the right side was grasped, divided and ligated with 0   Vicryl.  A moist lap tape was placed into the peritoneal cavity x2, this was   removed.  A Humphrey suture was then placed, approximated in the uterosacral   ligaments and to the posterior cul-de-sac, at times on the right side and a   Humphrey suture was placed through the uterosacral ligament on the left   side, grasping posterior rectovaginal fascia and completing the Humphrey   suture anteriorly.  A 0 Vicryl was used to approximate the pubocervical fascia   and rectovaginal fascia in a running stitch from right to left, running left   to right, the vagina was closed using 0 Vicryl suture.  A sponge stick was   placed in the vagina, legs were  lowered, diagnostic laparoscopy was performed,   separate bleeders were coagulated using the LigaSure instrument.  There was   good hemostasis.  Pictures were taken.  Under direct vision, the 5 mm trocar   was removed.  CO2 was allowed to escape from the peritoneal cavity and under   direct vision, the laparoscope was removed.  The rectus fascia was   approximated with 0 Vicryl in a single suture.  The subumbilical incision was   closed with 4-0 Vicryl in a running suture.  The subumbilical incision was   closed with 4-0 Vicryl in a running suture.  A cystoscopy was performed with a   70-degree lens.  Indigo carmine was seen to jet from the right ureteral   orifice and indigo carmine was seen to jet from left ureteral orifice.  There   were no trabeculae, signs of inflammation or gross pathology of the bladder.    The Bill catheter, which had been removed was replaced.  The needle and pad   count were correct.  The patient was taken to recovery room in satisfactory   condition.  Prior to the operation in my private office, we had discussed the   risks, benefits and alternatives to surgery.  Some serious and significant   risks included but were not limited to the risks of anesthesia, infection,   bleeding, injury to the bowel, bladder, ureter, deep vein thrombosis,   pulmonary emboli, and even death.  Informed consent was read and signed after   discussing in detail the operation laparoscopic-assisted vaginal hysterectomy,   right salpingo-oophorectomy.  All the patient's questions had been answered   prior to surgery.  Prior to the operation, patient noted that there were no   new medical problems.       ____________________________________     MD MARLA CHILDRESS / MARLIN    DD:  07/19/2018 09:35:14  DT:  07/19/2018 10:05:19    D#:  [id number]  Job#:  [id number]    cc: ANÍBAL NOLASCO MD, Michelle Carmona MD

## 2018-07-19 NOTE — CARE PLAN
Problem: Bowel/Gastric:  Goal: Normal bowel function is maintained or improved  Outcome: PROGRESSING AS EXPECTED  abd lap stab x 2. Diet advanced to full liquid. Pt educated on advancing to regular when ready. Encouraging ambulation and PO intake. Pt educated on importance of staying hydrated.     Problem: Pain Management  Goal: Pain level will decrease to patient's comfort goal  Outcome: PROGRESSING AS EXPECTED  Heat pack and ice pack provided and in use. PO oxy given for pain control. Extra pillows in use.

## 2018-07-19 NOTE — OR NURSING
0921-Received from OR via [place]. S/P lap hyst  Bedside report received from RN. And Anesthesiologist.    0945-restless, complaining of bladder discomfort, wanting to pee. fent. Given for discomfort. Immediate relief.    0950-Dr. Rossi at bedside. Plan to admit.    1015-awake, squirming continuously. States she has to pee. Assisted up to bathroom per pt request. midalzolam given for axiety. Assisted back to bed after sitting on toilet. No relief.    1030- sitting on edge of bed, crying, squirming around. Asking to take catheter out. Call out to Dr. Rossi. Received order to d'c hoffman. Hoffman removed and pt. Walked to bathroom voided small amount.    1100-in bed resting quietly.    1200-up to bathroom to void.    1230-toradol given for pain.    1243-report called to Floor.    1300-meets discharge criteria. To floor via [facility] with transport.

## 2018-07-19 NOTE — PROGRESS NOTES
Pt A&O x 4.  Reporting 8/10 pain. Pt medicated for pain per MAR.  Abd lap stab x 2 with bandaid dressings. Bandaids CDI.   Pt up to bathroom self, steady gait noted. +void. Pt reports bladder discomfort. Pt bladder scanned. Results of 9mL.  Pt requesting to be started on full liquid diet. Pt educated on advancing to regular when ready.  Admit profile complete.  Pt stating belongings are with . Unable to get a hold of  at this time.   Pt is a current cigarette smoker. No lighters or cigarettes at this time.   POC reviewed with pt. All needs addressed at this time.

## 2018-07-20 VITALS
HEIGHT: 61 IN | WEIGHT: 118.61 LBS | OXYGEN SATURATION: 99 % | TEMPERATURE: 98.5 F | RESPIRATION RATE: 18 BRPM | HEART RATE: 57 BPM | BODY MASS INDEX: 22.39 KG/M2 | DIASTOLIC BLOOD PRESSURE: 62 MMHG | SYSTOLIC BLOOD PRESSURE: 99 MMHG

## 2018-07-20 LAB
HCT VFR BLD AUTO: 34.8 % (ref 37–47)
HGB BLD-MCNC: 11.9 G/DL (ref 12–16)

## 2018-07-20 PROCEDURE — 85014 HEMATOCRIT: CPT

## 2018-07-20 PROCEDURE — 36415 COLL VENOUS BLD VENIPUNCTURE: CPT

## 2018-07-20 PROCEDURE — 51798 US URINE CAPACITY MEASURE: CPT

## 2018-07-20 PROCEDURE — 85018 HEMOGLOBIN: CPT

## 2018-07-20 PROCEDURE — 700102 HCHG RX REV CODE 250 W/ 637 OVERRIDE(OP): Performed by: OBSTETRICS & GYNECOLOGY

## 2018-07-20 PROCEDURE — A9270 NON-COVERED ITEM OR SERVICE: HCPCS | Performed by: OBSTETRICS & GYNECOLOGY

## 2018-07-20 PROCEDURE — 700111 HCHG RX REV CODE 636 W/ 250 OVERRIDE (IP): Performed by: OBSTETRICS & GYNECOLOGY

## 2018-07-20 RX ORDER — OXYCODONE HYDROCHLORIDE AND ACETAMINOPHEN 5; 325 MG/1; MG/1
1-2 TABLET ORAL EVERY 4 HOURS PRN
Status: DISCONTINUED | OUTPATIENT
Start: 2018-07-20 | End: 2018-07-20 | Stop reason: HOSPADM

## 2018-07-20 RX ADMIN — MORPHINE SULFATE 4 MG: 4 INJECTION INTRAVENOUS at 04:38

## 2018-07-20 RX ADMIN — OXYCODONE HYDROCHLORIDE 10 MG: 10 TABLET ORAL at 03:40

## 2018-07-20 RX ADMIN — OXYCODONE HYDROCHLORIDE AND ACETAMINOPHEN 2 TABLET: 5; 325 TABLET ORAL at 09:29

## 2018-07-20 ASSESSMENT — PAIN SCALES - GENERAL
PAINLEVEL_OUTOF10: 10
PAINLEVEL_OUTOF10: 10

## 2018-07-20 NOTE — PROGRESS NOTES
"Pt A&O x 4.     Vitals: /58   Pulse (!) 55   Temp 36.2 °C (97.1 °F)   Resp 18   Ht 1.549 m (5' 1\")   Wt 53.8 kg (118 lb 9.7 oz)   LMP 07/12/2018   SpO2 96%   Breastfeeding? No   BMI 22.41 kg/m²      Pt rates pain 9 out of 10. Medicated per MAR.      Neuro: GALVAN. Denies new onset of numbness/ tingling.     Cardiac: Denies new onset of chest pain.     Vascular: Pulses 2+ BUE, BLE. No edema noted.     Respiratory: Lungs sound clear to auscultation. On room air.  on, satting in 90's. Denies SOB.     GI: Abdomen soft, tender. Hypoactive bowel sounds, + flatus, - BM. Denies nausea/ vomiting. Tolerating full liquid diet.     : Pt c/o burning sensation when voiding but per pt, is resolving slowly. Pt voiding adequately post-surgery.     MSK: Pt up to bathroom stand by assist, tolerating well.     Integumentary: Two abdominal incisions to abd, dressings in place, both CDI. Skin intact otherwise.     Labs noted.     Fall precautions in place: Bed locked in lowest position, Upper bed rails up, treaded socks in place, personal belongings within reach, call light within reach, appropriate mobility signs in place, - bed alarm. Pt calls appropriately.      Pt updated on POC.    "

## 2018-07-20 NOTE — DISCHARGE SUMMARY
HISTORY OF PRESENT ILLNESS:  The patient is a 47-year-old white female    5, para 5 who underwent laparoscopically assisted vaginal hysterectomy, right   salpingo-oophorectomy.  The patient has a past medical history significant   for a left dermoid cyst.  She underwent a left salpingo-oophorectomy.  The   patient developed a right ovarian cyst, which was septated and complex and had   a potential dermoid.  The patient complained of severe lower abdominal pain.    PAST MEDICAL HISTORY:  Significant for hepatitis C, methamphetamine abuse,   drug abuse, alcohol abuse, gastritis, constipation, anxiety, fibroid uterus,   and depression.    PAST SURGICAL HISTORY:  As noted, is significant for a left   salpingo-oophorectomy for a dermoid cyst.  She is status post cholecystectomy   and had previously undergone a tubal ligation.    CURRENT MEDICATIONS:  1.  Rozerem 8 mg 1 tab p.o. at bedtime.  2.  Colace 100 mg 1 tab p.o. daily.  3.  Invega 6 mg oral tablets 1 tab orally daily.  4.  MiraLax powder 17 g.  5.  Bupropion 150 mg extended release.  6.  Citalopram 40 mg 1 tab orally daily.  7.  Pantoprazole 40 mg 1 tablet orally daily.  8.  Propranolol 1 tab orally daily.  9.  ProAir 90 mcg 2 puffs q. 4-6 hours p.r.n. pain.  10.  Zofran 4 mg 1 tab q. 8 hours.  11.  Promethazine 12.5 mg p.r.n. nausea and vomiting.    ALLERGIES:  No known allergies.    SOCIAL HISTORY AND HABITS:  Patient smokes 1/2 pack of cigarettes a day and   has done so for 30 years.  She does not partake of alcoholic beverages.  The   patient has been treated for drug abuse in the past.  She is not using   recreational drugs.    MARITAL STATUS:  Single.    EDUCATION:  High school.    FAMILY HISTORY:  The patient's father is  at age 65 secondary to   hepatitis C.  Her mother is 64 years of age and in good health.    REVIEW OF SYSTEMS:  CONSTITUTIONAL:  The patient had complained of abdominal pain, nausea,   vomiting, change in bowel habits, and  diarrhea.  She complained of pelvic   pain.  URINARY:  She complains of difficulty emptying her bladder and occasional   leaking of urine and nocturia.  ENDOCRINE:  The patient denies any heat or cold intolerance or sleep   disturbances.  She complains of anxiety and depression.    PHYSICAL EXAMINATION:  VITAL SIGNS:  Patient is 61 inches tall, 116 pounds, BMI is 21.9.  Blood   pressure 110/80, pulse 65, and respirations 16.  GENERAL:  This is a thin white female who is somewhat anxious.  PELVIC:  Pertinent physical findings, pelvic exam revealed the uterus to be   normal in size, smooth, mobile with a right adnexal mass.    LABORATORY DATA:  Preop hemoglobin was 13.2, hematocrit 39.0.  Liver function   tests were within normal limits.    IMAGING:  Transvaginal ultrasound revealed the uterus to be 5.76x8.32x6.19 cm.    The right ovary measured 5.66y76p0.3 cm.  Multiple cystic lesions are   present, internal echoes and septations.    HOSPITAL COURSE AND FOLLOWUP:  The patient underwent a laparoscopic-assisted   vaginal hysterectomy, right salpingo-oophorectomy.  She followed a benign   postoperative course and was discharged on postop day #1 on Percocet 7.5   mg/325 mg #30, one tab orally q. 4-6 hours p.r.n. pain.  Do not use the entire   prescription in less than 5 days.  The patient will be seen in my private   office in 2 weeks and 6 weeks.  Postop instructions were given to call should   she have a temperature of 100.4 degrees Fahrenheit, severe lower abdominal   pain, calf pain or abnormal bleeding.       ____________________________________     MD MARLA CHILDRESS / MARLIN    DD:  07/20/2018 10:59:51  DT:  07/20/2018 11:24:31    D#:  [id number]  Job#:  [id number]    cc: ANÍBAL NOLASCO MD

## 2018-07-20 NOTE — DISCHARGE INSTRUCTIONS
Discharge Instructions    Discharged to home by car with relative. Discharged via wheelchair, hospital escort: Yes.  Special equipment needed: Not Applicable    Be sure to schedule a follow-up appointment with your primary care doctor or any specialists as instructed.     Discharge Plan:   Smoking Cessation Offered: Patient Refused  Influenza Vaccine Indication: Indicated: Not available from distributor/    I understand that a diet low in cholesterol, fat, and sodium is recommended for good health. Unless I have been given specific instructions below for another diet, I accept this instruction as my diet prescription.   Other diet: Regular    Special Instructions:   Please call for a temperature greater or equal to 100.4 degrees Farenheit, excessive vaginal bleeding, foul smelling discharge, or calf painCall Dr. Rossi for a temp > 100.4 degrees Farenheit, excessive bleeding, or foul smelling discharge.      · Is patient discharged on Warfarin / Coumadin?   No     Depression / Suicide Risk    As you are discharged from this [facility] facility, it is important to learn how to keep safe from harming yourself.    Recognize the warning signs:  · Abrupt changes in personality, positive or negative- including increase in energy   · Giving away possessions  · Change in eating patterns- significant weight changes-  positive or negative  · Change in sleeping patterns- unable to sleep or sleeping all the time   · Unwillingness or inability to communicate  · Depression  · Unusual sadness, discouragement and loneliness  · Talk of wanting to die  · Neglect of personal appearance   · Rebelliousness- reckless behavior  · Withdrawal from people/activities they love  · Confusion- inability to concentrate     If you or a loved one observes any of these behaviors or has concerns about self-harm, here's what you can do:  · Talk about it- your feelings and reasons for harming yourself  · Remove any means that you might  use to hurt yourself (examples: pills, rope, extension cords, firearm)  · Get professional help from the community (Mental Health, Substance Abuse, psychological counseling)  · Do not be alone:Call your Safe Contact- someone whom you trust who will be there for you.  · Call your local CRISIS HOTLINE 727-2609 or 380-653-7983  · Call your local Children's Mobile Crisis Response Team [place] (064) 617-6768 or www.[employer]  · Call the toll free National Suicide Prevention Hotlines   · National Suicide Prevention Lifeline 835-043-RKTK (5033)  · National Hope Line Network 800-SUICIDE (544-2773)    Laparoscopically Assisted Vaginal Hysterectomy, Care After  Refer to this sheet in the next few weeks. These instructions provide you with information on caring for yourself after your procedure. Your health care provider may also give you more specific instructions. Your treatment has been planned according to current medical practices, but problems sometimes occur. Call your health care provider if you have any problems or questions after your procedure.  WHAT TO EXPECT AFTER THE PROCEDURE  After your procedure, it is typical to have the following:  · Abdominal pain. You will be given pain medicine to control it.  · Sore throat from the breathing tube that was inserted during surgery.  HOME CARE INSTRUCTIONS  · Only take over-the-counter or prescription medicines for pain, discomfort, or fever as directed by your health care provider.  · Do not take aspirin. It can cause bleeding.  · Do not drive when taking pain medicine.  · Follow your health care provider's advice regarding diet, exercise, lifting, driving, and general activities.  · Resume your usual diet as directed and allowed.  · Get plenty of rest and sleep.  · Do not douche, use tampons, or have sexual intercourse for at least 6 weeks, or until your health care provider gives you permission.  · Change your bandages (dressings) as directed by your health  care provider.  · Monitor your temperature and notify your health care provider of a fever.  · Take showers instead of baths for 2-3 weeks.  · Do not drink alcohol until your health care provider gives you permission.  · If you develop constipation, you may take a mild laxative with your health care provider's permission. Bran foods may help with constipation problems. Drinking enough fluids to keep your urine clear or pale yellow may help as well.  · Try to have someone home with you for 1-2 weeks to help around the house.  · Keep all of your follow-up appointments as directed by your health care provider.  SEEK MEDICAL CARE IF:   · You have swelling, redness, or increasing pain around your incision sites.  · You have pus coming from your incision.  · You notice a bad smell coming from your incision.  · Your incision breaks open.  · You feel dizzy or lightheaded.  · You have pain or bleeding when you urinate.  · You have persistent diarrhea.  · You have persistent nausea and vomiting.  · You have abnormal vaginal discharge.  · You have a rash.  · You have any type of abnormal reaction or develop an allergy to your medicine.  · You have poor pain control with your prescribed medicine.  SEEK IMMEDIATE MEDICAL CARE IF:   · You have a fever.  · You have severe abdominal pain.  · You have chest pain.  · You have shortness of breath.  · You faint.  · You have pain, swelling, or redness in your leg.  · You have heavy vaginal bleeding with blood clots.  MAKE SURE YOU:  · Understand these instructions.  · Will watch your condition.  · Will get help right away if you are not doing well or get worse.     This information is not intended to replace advice given to you by your health care provider. Make sure you discuss any questions you have with your health care provider.     Document Released: 12/06/2012 Document Revised: 12/23/2014 Document Reviewed: 07/03/2014  Elsevier Interactive Patient Education ©2016 Elsevier  Inc.

## 2018-07-20 NOTE — PROGRESS NOTES
Discharge instructions reviewed with pt. Prescriptions given by MD. ALVA d/c. Personal belongings packed and with pt. Pt d/c home with boyfriend. Escorted out via wheelchair with CNA.

## 2018-07-20 NOTE — CARE PLAN
Problem: Safety  Goal: Will remain free from injury  Outcome: PROGRESSING AS EXPECTED  Pt calls appropriately for assistance. Pt updated on POC, all questions answered at this time. Bed in locked and lowest position. Call light and belongings within reach.    Problem: Bowel/Gastric:  Goal: Normal bowel function is maintained or improved  Outcome: PROGRESSING AS EXPECTED  + Bowel sounds. Tolerating full liquid diet.

## 2018-07-20 NOTE — PROGRESS NOTES
"Pt A&O x 4.  Reporting 10/10 pain. Pt stating oxycodone is not helping relieve pain at all, pt requesting IV morphine instead. VSS at this time. Pt educated on importance of weaning off IV narcotics to prepare for d/c. Dr. Flo otto. Orders to d/c oxycodone and start on percocet.   Abd lap stab x 2 with bandaid dressings. Bandaids CDI.  Pt up to restroom self +void. Pt reporting feeling pressure stating, \"I don't feel like I'm emptying my bladder all the way. This has happened to me before.\"   Bladder scan complete. Results of 164mL post void. Encouraging ambulation. Will continue to monitor u/o.   POC reviewed with pt. All needs addressed at this time.   "

## 2018-07-24 NOTE — DOCUMENTATION QUERY
DOCUMENTATION QUERY    PROVIDERS: Please select “Cosign w/ note”to reply to query.    To better represent the severity of illness of your patient, please review the following information and exercise your independent professional judgment in responding to this query.       A laparoscopically-assisted hysterectomy was performed on 07/19/18. Focal adenomyosis and submucosal leiomyoma are noted in the pathology results.  These conditions were not documented for this hospital stay. Please confirm if you agree with the pathology findings.    • The patient has a diagnosis of focal adenomyosis and submucosal leiomyoma  • The patient does not have a dignosis of focal adenomyosis or submucosal leiomyoma  • Other Clinical Significance (please document)  • Unable to determine        The medical record reflects the following:   Clinical Findings Pathology Report 07/19/18:  Myometrium: Submucosal leiomyoma, 1.2 cm in greatest dimension.           Focal adenomyosis    OP Report States:  POSTOPERATIVE DIAGNOSES:  Right ovarian cyst, possible dermoid, pelvic pain,   history of hepatitis C.   Treatment  laparoscopically- assisted hyterectomy   Risk Factors     Location within medical record  Op report by Dr. Rossi on 07/19+/18, pathology results     Thank You,   Christi Shanks BA, CCS, CPC, WANG

## 2018-09-24 ENCOUNTER — HOSPITAL ENCOUNTER (EMERGENCY)
Facility: MEDICAL CENTER | Age: 48
End: 2018-09-24
Attending: EMERGENCY MEDICINE
Payer: MEDICAID

## 2018-09-24 VITALS
SYSTOLIC BLOOD PRESSURE: 98 MMHG | TEMPERATURE: 97.1 F | OXYGEN SATURATION: 97 % | BODY MASS INDEX: 22.48 KG/M2 | HEART RATE: 64 BPM | WEIGHT: 119 LBS | RESPIRATION RATE: 16 BRPM | DIASTOLIC BLOOD PRESSURE: 61 MMHG

## 2018-09-24 DIAGNOSIS — S01.01XA LACERATION OF SCALP, INITIAL ENCOUNTER: ICD-10-CM

## 2018-09-24 DIAGNOSIS — S09.90XA CLOSED HEAD INJURY, INITIAL ENCOUNTER: ICD-10-CM

## 2018-09-24 LAB
HBV SURFACE AG SER QL: NEGATIVE
HCV AB SER QL: REACTIVE
HIV 1+2 AB+HIV1 P24 AG SERPL QL IA: NON REACTIVE

## 2018-09-24 PROCEDURE — 87522 HEPATITIS C REVRS TRNSCRPJ: CPT

## 2018-09-24 PROCEDURE — 99284 EMERGENCY DEPT VISIT MOD MDM: CPT

## 2018-09-24 PROCEDURE — 304217 HCHG IRRIGATION SYSTEM

## 2018-09-24 PROCEDURE — 304999 HCHG REPAIR-SIMPLE/INTERMED LEVEL 1

## 2018-09-24 PROCEDURE — 305308 HCHG STAPLER,SKIN,DISP.

## 2018-09-24 ASSESSMENT — PAIN SCALES - GENERAL: PAINLEVEL_OUTOF10: 0

## 2018-09-24 NOTE — ED PROVIDER NOTES
ED Provider Note    Scribed for Brenton Tejeda M.D. by Baldemar Quick. 9/24/2018  12:49 PM    Primary Care Provider: Mary Lou Silver M.D.  Means of arrival: EMS  History limited by: None    CHIEF COMPLAINT  Chief Complaint   Patient presents with   • Head Laceration   • Medical Clearance       HPI  Isa Bowser is a 48 y.o. female who presents to the ED complaining of a head laceration and medical clearance. The patient presents with EMS from [facility] for evaluation of a head laceration as well as medical clearance for incarceration. EMS reports the patient was pulled over by [facility] for suspected DUI and upon contact with [employer] she began fighting with officers. She was placed in patrol vehicle and began slamming her head into the window lacerating the back of her head. She has received Haldol 5 mg and 50 mg benadryl prior to arrival. She denies any eye discharge, facial pain, chest pain, shortness of breath, abdominal pain, back pain, headache, weakness, numbness.     Patient presents in police custody.     REVIEW OF SYSTEMS    CONSTITUTIONAL:  Offers no complaints.   EYES:  Denies discharge.   ENT:  Denies face pain.   CARDIOVASCULAR:  Denies chest pain.   RESPIRATORY:  Denies shortness of breath, difficulty breathing.  GI:  Denies abdominal pain.  MUSCULOSKELETAL:  Denies back pain.  SKIN:  Notes a cut to the back of her head.   NEUROLOGIC:  Denies headache, focal weakness, or numbness.    PAST MEDICAL HISTORY  Past Medical History:   Diagnosis Date   • Alcohol abuse     depression anxiety   • Bowel habit changes     Constipation   • Dental disorder     full denture   • Gynecological disorder    • Heart burn    • Hepatitis C 2003   • Infectious disease     hep c   • Pituitary tumor    • Psychiatric disorder     bipolar       FAMILY HISTORY  None pertinent     SOCIAL HISTORY   reports that she has been smoking.  She has a 45.00 pack-year smoking history. She has never used smokeless tobacco. She  reports that she uses drugs, including Marijuana. She reports that she does not drink alcohol.    SURGICAL HISTORY  Past Surgical History:   Procedure Laterality Date   • VAGINAL HYSTERECTOMY SCOPE TOTAL N/A 7/19/2018    Procedure: VAGINAL HYSTERECTOMY SCOPE TOTAL;  Surgeon: Aryan Rossi M.D.;  Location: SURGERY SAME DAY [facility];  Service: Gynecology   • SALPINGO OOPHORECTOMY Right 7/19/2018    Procedure: SALPINGO OOPHORECTOMY;  Surgeon: Aryan Rossi M.D.;  Location: SURGERY SAME DAY [facility];  Service: Gynecology   • CYSTOSCOPY N/A 7/19/2018    Procedure: CYSTOSCOPY;  Surgeon: Aryan Rossi M.D.;  Location: SURGERY SAME DAY [facility];  Service: Gynecology   • CYSTOSCOPY  4/12/2017    Procedure: CYSTOSCOPY;  Surgeon: Aryan Rossi M.D.;  Location: SURGERY SAME DAY [facility];  Service:    • PELVISCOPY  4/12/2017    Procedure: PELVISCOPY - LEFT SALPINGO-OOPHERECTOMY;  Surgeon: Aryan Rossi M.D.;  Location: SURGERY SAME DAY [facility];  Service:    • OVARIAN CYSTECTOMY Right 4/12/2017    Procedure: SALPINGECTOMY AND OVARIAN BIOPSY ;  Surgeon: Aryan Rossi M.D.;  Location: SURGERY SAME DAY [facility];  Service:    • GYN SURGERY  2006    tubal ligation   • CHOLECYSTECTOMY     • OTHER      teeth removed   • OTHER ABDOMINAL SURGERY      gallbladder   • TUBAL COAGULATION LAPAROSCOPIC BILATERAL         CURRENT MEDICATIONS  No current facility-administered medications on file prior to encounter.      Current Outpatient Prescriptions on File Prior to Encounter   Medication Sig Dispense Refill   • diazePAM (VALIUM) 5 MG Tab Take 5 mg by mouth every 6 hours as needed for Anxiety.     • citalopram (CELEXA) 20 MG Tab Take 20 mg by mouth every morning.     • zolpidem (AMBIEN) 5 MG Tab Take 5 mg by mouth at bedtime as needed for Sleep.       ALLERGIES  No Known Allergies    PHYSICAL EXAM  VITAL SIGNS: BP (!) 98/61   Pulse 64   Temp 36.2 °C (97.1 °F)   Resp 16   Wt 54 kg (119 lb)    SpO2 97%   BMI 22.48 kg/m²      Constitutional: Patient is awake and alert. No acute respiratory distress. Smells of alcohol.   HENT: 1 cm cut to the back of her head.  No step-offs.  No araujo sign or raccoon eyes.  Bilateral external ears normal, Oropharynx pink moist with no exudates, Nose patent.  Eyes: PERRLA, EOMI, Sclera injectiva,  positive lateral nystagmus no discharge.   Neck:  Supple no nuchal rigidity, no thyromegaly or mass. Non-tender  Lymphatic: No supraclavicular lymph nodes.   Cardiovascular: Heart is regular rate and rhythm no murmur,   Thorax & Lungs: Chest is symmetrical, with good breath sounds. No wheezing or crackles. No respiratory distress, No chest tenderness.   Abdomen: Soft, No tenderness no hepatosplenomegaly there is no guarding or rebound, No masses, No pulsatile masses.   Skin: Warm, Dry, no petechia, purpura, or rash.  Positive laceration as described above occiput 1 cm  Back: Non tender with palpation, No CVA tenderness.   Extremities: No edema. Non tender.   Musculoskeletal: Good range of motion to wrists, elbows, shoulders, hips, knees, and ankles. Pulses 2+ radially and femorally. No gross deformities noted.   Neurologic: Alert & oriented to person, time, and place.  Initially was sleeping on the bed but then when I talked her she woke up immediately.  Strength is 5 over 5 and symmetric in bilateral upper and lower extremities.  Sensory is intact to light touch to face, arms, and legs.  DTRs are symmetrical in biceps brachioradialis, patella and Achilles.     Laceration Repair Procedure    Indication: Laceration    Location/Description: Occiput 1 cm    Procedure: The patient was placed in the appropriate position and The area was then irrigated with normal saline. The laceration was closed with 1 staple. There were no additional lacerations requiring repair. The wound area was then dressed with bacitracin.    Patient did not want any lidocaine.  So we placed one staple is  noted.  Total repaired wound length: 1 cm.         The patient tolerated the procedure well.    Complications: None      COURSE & MEDICAL DECISION MAKING  Pertinent Labs & Imaging studies reviewed. (See chart for details)      12:49 PM - Patient seen and examined at bedside. With the patients presentation I do not think that imaging Is warranted. I offered anaesthesia for a staple to be placed to close the patients injury but she declined.     1:32 PM Laceration repair procedure.     1:38 PM The patient is medically cleared and discharged back into police custody at this time.     Decision Making  Patient comes in the emergency department after being arrested.  Patient and the police state that she is banging her head against the window and then sustained a cut to the back of her head.  Here in the emergency room she is awake alert neurologically intact.  She has a 1 cm laceration occiput.  This area was cleaned in usual fashion.  I placed 1 staple in the area.  She will be discharged with the police.    The patient will return for new or worsening symptoms and is stable at the time of discharge.    DISPOSITION:  Patient will be discharged into police custody in stable condition.    FOLLOW UP:  Mary Lou Silver M.D.  [address]  [address] [address]  [phone]    In 1 week        OUTPATIENT MEDICATIONS:  New Prescriptions    No medications on file         FINAL IMPRESSION  1. Laceration of scalp, initial encounter Acute   2. Closed head injury, initial encounter Acute       PLAN  1.  Staple out 7-10 days  2.  Head trauma information sheet  3.  Wound care information she  4.  Discharged with police  5. Return to the emergency department for increased pains, fevers, vomiting or change in condition.     Baldemar NAIR (Mckinley), am scribing for, and in the presence of, Brenton Tejeda M.D..    Electronically signed by: Baldemar Quick (Scribe), 9/24/2018    Brenton NAIR M.D. personally  performed the services described in this documentation, as scribed by Baldemar Quick in my presence, and it is both accurate and complete. E.     The note accurately reflects work and decisions made by me.  Brenton Tejeda  9/24/2018  4:11 PM

## 2018-09-24 NOTE — ED NOTES
Pt discharged to custody of [employer]. Pt was given follow up instructions to have staples removed in 7-10 days. Pt verbalized understanding of all instructions for discharge and is ambulatory out of ED with officers, steady gait noted.

## 2018-09-24 NOTE — ED NOTES
Pt agreeable to a blood draw for a source panel for the exposed officers. Consent form signed and is on the chart.

## 2018-09-24 NOTE — ED TRIAGE NOTES
49 y/o female bib [facility] [employer] [facility] for evaluation of a head laceration as well as medical clearance for incarceration. Pt was pulled over by [facility] for suspected DUI, upon contact with [employer], pt began to fight with officers, she was placed in the patrol vehicle and began slamming her head into the window lacerating the back of her head. Pt was medicated with 5mg Haldol and 50 mg Benadryl prior to arrival.

## 2018-09-26 LAB
HCV RNA SERPL NAA+PROBE-ACNC: NOT DETECTED IU/ML
HCV RNA SERPL NAA+PROBE-LOG IU: NOT DETECTED LOG IU/ML

## 2019-11-12 ENCOUNTER — HOSPITAL ENCOUNTER (OUTPATIENT)
Dept: RADIOLOGY | Facility: MEDICAL CENTER | Age: 49
End: 2019-11-12
Attending: NURSE PRACTITIONER
Payer: MEDICAID

## 2019-11-12 DIAGNOSIS — R11.2 NAUSEA AND VOMITING, INTRACTABILITY OF VOMITING NOT SPECIFIED, UNSPECIFIED VOMITING TYPE: ICD-10-CM

## 2019-11-12 DIAGNOSIS — R10.13 EPIGASTRIC PAIN: ICD-10-CM

## 2019-11-12 DIAGNOSIS — R11.0 NAUSEA: ICD-10-CM

## 2019-11-12 PROCEDURE — A9541 TC99M SULFUR COLLOID: HCPCS

## 2020-01-21 ENCOUNTER — HOSPITAL ENCOUNTER (OUTPATIENT)
Dept: RADIOLOGY | Facility: MEDICAL CENTER | Age: 50
End: 2020-01-21
Attending: NURSE PRACTITIONER
Payer: MEDICAID

## 2020-01-21 DIAGNOSIS — R10.9 ABDOMINAL PAIN, UNSPECIFIED ABDOMINAL LOCATION: ICD-10-CM

## 2020-01-21 DIAGNOSIS — R14.0 BLOATING: ICD-10-CM

## 2020-01-21 DIAGNOSIS — D18.03 LIVER HEMANGIOMA: ICD-10-CM

## 2020-01-21 PROCEDURE — 76700 US EXAM ABDOM COMPLETE: CPT

## 2020-12-28 ENCOUNTER — HOSPITAL ENCOUNTER (OUTPATIENT)
Dept: RADIOLOGY | Facility: MEDICAL CENTER | Age: 50
End: 2020-12-28
Attending: NURSE PRACTITIONER
Payer: MEDICAID

## 2020-12-28 DIAGNOSIS — D18.03 LIVER HEMANGIOMA: ICD-10-CM

## 2020-12-28 PROCEDURE — 76700 US EXAM ABDOM COMPLETE: CPT

## 2021-11-30 ENCOUNTER — HOSPITAL ENCOUNTER (EMERGENCY)
Facility: MEDICAL CENTER | Age: 51
End: 2021-12-01
Attending: EMERGENCY MEDICINE
Payer: MEDICAID

## 2021-11-30 DIAGNOSIS — F41.9 ANXIETY: ICD-10-CM

## 2021-11-30 PROCEDURE — 700102 HCHG RX REV CODE 250 W/ 637 OVERRIDE(OP): Performed by: EMERGENCY MEDICINE

## 2021-11-30 PROCEDURE — 99284 EMERGENCY DEPT VISIT MOD MDM: CPT

## 2021-11-30 PROCEDURE — A9270 NON-COVERED ITEM OR SERVICE: HCPCS | Performed by: EMERGENCY MEDICINE

## 2021-11-30 RX ORDER — LORAZEPAM 1 MG/1
1 TABLET ORAL ONCE
Status: COMPLETED | OUTPATIENT
Start: 2021-11-30 | End: 2021-11-30

## 2021-11-30 RX ADMIN — LORAZEPAM 1 MG: 1 TABLET ORAL at 22:42

## 2021-12-01 VITALS
HEIGHT: 61 IN | DIASTOLIC BLOOD PRESSURE: 74 MMHG | TEMPERATURE: 98 F | RESPIRATION RATE: 15 BRPM | BODY MASS INDEX: 22.47 KG/M2 | WEIGHT: 119 LBS | HEART RATE: 60 BPM | SYSTOLIC BLOOD PRESSURE: 134 MMHG | OXYGEN SATURATION: 98 %

## 2021-12-01 NOTE — ED TRIAGE NOTES
"Chief Complaint   Patient presents with   • Anxiety     pt BIB EMS for acute panic attack. Pt states she has two family members that resulted positive for covid today which the patient states caused her to become anxious. Pt has history of anxiety and bipolar disorder. Pt shaking arms in air she states due to anxiety. Pt requestin medications for sedation.       EMS to triage for above complaint.   Educated on triage process, encourage to inform staff of any changes.     /71   Pulse 75   Resp 20   Ht 1.549 m (5' 1\")   Wt 54 kg (119 lb)   SpO2 94%   BMI 22.48 kg/m²     "

## 2021-12-01 NOTE — ED PROVIDER NOTES
ER Provider Note     Scribed for Janusz Hernandez M.D. by Saige West. 11/30/2021, 10:34 PM.    Primary Care Provider: RADHA Montes (Inactive)  Means of Arrival: EMS   History obtained from: Patient  History limited by: None     CHIEF COMPLAINT  Chief Complaint   Patient presents with    Anxiety     pt BIB EMS for acute panic attack. Pt states she has two family members that resulted positive for covid today which the patient states caused her to become anxious. Pt has history of anxiety and bipolar disorder. Pt shaking arms in air she states due to anxiety. Pt requestin medications for sedation.       HPI  Isa Bowser is a 51 y.o. female who presents to the Emergency Department by EMS for evaluation of acute panic attack onset 1 day ago. Patient reports she has a family member that is dying due to COVID and that has been making her feel anxious. She presents associated symptoms of body tremors secondary to anxiety. Denies chest pain or shortness of breath. Patient has history of anxiety attacks and her last attack was approximately 5 months ago. She takes medications for her anxiety as prescribed but states her anxiety is not getting better. History of anxiety.     REVIEW OF SYSTEMS  See HPI for further details. All other systems are negative.     PAST MEDICAL HISTORY   has a past medical history of Alcohol abuse, Bowel habit changes, Dental disorder, Gynecological disorder, Heart burn, Hepatitis C (2003), Infectious disease, Pituitary tumor, and Psychiatric disorder.    SURGICAL HISTORY   has a past surgical history that includes cystoscopy (4/12/2017); other abdominal surgery; cholecystectomy; gyn surgery (2006); tubal coagulation laparoscopic bilateral; pelviscopy (4/12/2017); ovarian cystectomy (Right, 4/12/2017); other; vaginal hysterectomy scope total (N/A, 7/19/2018); salpingo oophorectomy (Right, 7/19/2018); and cystoscopy (N/A, 7/19/2018).    SOCIAL HISTORY  Social History  "    Tobacco Use    Smoking status: Current Every Day Smoker     Packs/day: 1.50     Years: 30.00     Pack years: 45.00    Smokeless tobacco: Never Used    Tobacco comment: 12 per day for 20 years   Substance Use Topics    Alcohol use: No    Drug use: Yes     Types: Marijuana     Comment: Marijuana      Social History     Substance and Sexual Activity   Drug Use Yes    Types: Marijuana    Comment: Marijuana       FAMILY HISTORY  No family history on file.    CURRENT MEDICATIONS  Current Outpatient Medications   Medication Instructions    citalopram (CELEXA) 20 mg, Oral, EVERY MORNING    diazePAM (VALIUM) 5 mg, Oral, EVERY 6 HOURS PRN    zolpidem (AMBIEN) 5 mg, Oral, NIGHTLY PRN     ALLERGIES  No Known Allergies    PHYSICAL EXAM  VITAL SIGNS: /71   Pulse 75   Resp 20   Ht 1.549 m (5' 1\")   Wt 54 kg (119 lb)   SpO2 94%   BMI 22.48 kg/m²      Constitutional: Anxious appearing, somewhat tremulous   HENT: Normocephalic, Atraumatic, Bilateral external ears normal. Nose normal.   Eyes: Pupils are equal and reactive. Conjunctiva normal, non-icteric.   Heart: Regular rate and rythm, no murmurs.    Lungs: Clear to auscultation bilaterally.  Skin: Warm, Dry, No erythema, No rash.   Neurologic: Alert, Grossly non-focal.   Psychiatric: Anxious appearing, somewhat tremulous    DIAGNOSTIC STUDIES / PROCEDURES    COURSE & MEDICAL DECISION MAKING  Pertinent Labs & Imaging studies reviewed. (See chart for details)    This is a 51 y.o. female that presents with what appears to be an acute panic attack.  At this time the patient is not tachycardic and I believe this is PE.  There is no chest pain to suggest a myocardial event.  We will give her medication to calm her down.  I will reassess after this..     10:34 PM - Patient seen and examined at bedside.  Patient will be medicated with Ativan 1 mg for her symptoms. Informed patient that we will give her medication for her anxiety. Patient verbalizes understanding and " "agreement to this plan of care.      Feeling improved after the Ativan.  Will discharge home with strict return precautions and follow-up.    11:00 PM Patient was re-evaluated at bedside. Patient feels improved. I discussed with patient care of plan following discharge. Patient was given opportunity to ask questions at this time. Patient verbalizes understanding and agrees to care of plan.  Patient will be discharged at this time. Patient will be discharged with a  referral to establish PCP. Her vital signs prior to discharge: /71   Pulse 75   Resp 20   Ht 1.549 m (5' 1\")   Wt 54 kg (119 lb)   SpO2 94%   BMI 22.48 kg/m²        The patient will return for new or worsening symptoms and is stable at the time of discharge.    The patient is referred to a primary physician for blood pressure management, diabetic screening, and for all other preventative health concerns.    DISPOSITION:  Patient will be discharged home in stable condition.    FOLLOW UP:  [address] [address] [phone]  Go in 2 days    FINAL IMPRESSION  1. Anxiety          Saige NAIR (Mckinley), am scribing for, and in the presence of, Janusz Hernandez M.D..    Electronically signed by: Saige West (Mckinley), 11/30/2021    Janusz NAIR M.D. personally performed the services described in this documentation, as scribed by Saige West in my presence, and it is both accurate and complete.     E    The note accurately reflects work and decisions made by me.  Janusz Hernandez M.D.  12/1/2021  1:21 AM      "

## 2021-12-01 NOTE — PROGRESS NOTES
"Patient found resting in room with eyes closed. Patient verbalizes decrease in anxiety and endorses feeling \"more relaxed.\"Patient provided with discharge instructions. Patient verbalizes understanding. Patient assisted out of ED with steady gait.   "

## 2022-02-14 ENCOUNTER — HOSPITAL ENCOUNTER (OUTPATIENT)
Dept: RADIOLOGY | Facility: MEDICAL CENTER | Age: 52
End: 2022-02-14
Attending: NURSE PRACTITIONER
Payer: MEDICAID

## 2022-02-14 DIAGNOSIS — K59.00 CONSTIPATION, UNSPECIFIED CONSTIPATION TYPE: ICD-10-CM

## 2022-02-14 DIAGNOSIS — Z86.19 HISTORY OF HEPATITIS C: ICD-10-CM

## 2022-02-14 PROCEDURE — 76700 US EXAM ABDOM COMPLETE: CPT

## 2022-03-25 ENCOUNTER — APPOINTMENT (OUTPATIENT)
Dept: RADIOLOGY | Facility: MEDICAL CENTER | Age: 52
End: 2022-03-25
Attending: NURSE PRACTITIONER
Payer: MEDICAID

## 2022-03-31 ENCOUNTER — HOSPITAL ENCOUNTER (OUTPATIENT)
Dept: RADIOLOGY | Facility: MEDICAL CENTER | Age: 52
End: 2022-03-31
Attending: NURSE PRACTITIONER
Payer: MEDICAID

## 2022-03-31 DIAGNOSIS — K76.89 LIVER CYST: ICD-10-CM

## 2022-03-31 PROCEDURE — 74183 MRI ABD W/O CNTR FLWD CNTR: CPT

## 2022-03-31 PROCEDURE — A9576 INJ PROHANCE MULTIPACK: HCPCS | Performed by: NURSE PRACTITIONER

## 2022-03-31 PROCEDURE — 700117 HCHG RX CONTRAST REV CODE 255: Performed by: NURSE PRACTITIONER

## 2022-03-31 RX ADMIN — GADOTERIDOL 12 ML: 279.3 INJECTION, SOLUTION INTRAVENOUS at 09:38

## 2023-03-16 ENCOUNTER — APPOINTMENT (OUTPATIENT)
Dept: URGENT CARE | Facility: PHYSICIAN GROUP | Age: 53
End: 2023-03-16
Payer: MEDICAID

## (undated) DEVICE — GOWN SURGEONS LARGE - (32/CA)

## (undated) DEVICE — TROCAR STEP 5MM - (3/CA)

## (undated) DEVICE — SUTURE 0 VICRYL PLUS UR-6 - 27 INCH (36/BX)

## (undated) DEVICE — BAG RETRIEVAL 10ML (10EA/BX)

## (undated) DEVICE — KIT  I.V. START (100EA/CA)

## (undated) DEVICE — PACK LAPAROSCOPY - (1/CA)

## (undated) DEVICE — TUBE E-T HI-LO CUFF 7.5MM (10EA/PK)

## (undated) DEVICE — SENSOR SPO2 NEO LNCS ADHESIVE (20/BX) SEE USER NOTES

## (undated) DEVICE — GOWN WARMING STANDARD FLEX - (30/CA)

## (undated) DEVICE — SET SUCTION/IRRIGATION WITH DISPOSABLE TIP (6/CA )PART #0250-070-520 IS A SUB

## (undated) DEVICE — BLADE SURGICAL #15 - (50/BX 3BX/CA)

## (undated) DEVICE — PAD SANITARY 11IN MAXI IND WRAPPED  (12EA/PK 24PK/CA)

## (undated) DEVICE — BANDAID X-LARGE 2 X 4 IN LF (50EA/BX)

## (undated) DEVICE — SODIUM CHL IRRIGATION 0.9% 1000ML (12EA/CA)

## (undated) DEVICE — SUTURE 4-0 VICRYL PLUS FS-2 - 27 INCH (36/BX)

## (undated) DEVICE — TUBING SETDISPOS HIGH FLOW II - (10/BX)

## (undated) DEVICE — SUCTION INSTRUMENT YANKAUER BULBOUS TIP W/O VENT (50EA/CA)

## (undated) DEVICE — DRESSING TEGADERM 8 X 12 - (10/BX 8BX/CA)

## (undated) DEVICE — MANIFOLD NEPTUNE 1 PORT (20/PK)

## (undated) DEVICE — SUTURE GENERAL

## (undated) DEVICE — TRAY SRGPRP PVP IOD WT PRP - (20/CA)

## (undated) DEVICE — SUTURE 0 VICRYL PLUS CT-1 - 8 X 18 INCH (12/BX)

## (undated) DEVICE — ELECTRODE DUAL RETURN W/ CORD - (50/PK)

## (undated) DEVICE — Device

## (undated) DEVICE — BANDAGE STERILE 3 IN X 75 IN (12EA/BX 8BX/CA)

## (undated) DEVICE — TUBE CONNECTING SUCTION - CLEAR PLASTIC STERILE 72 IN (50EA/CA)

## (undated) DEVICE — GOWN SURGEONS X-LARGE - DISP. (30/CA)

## (undated) DEVICE — GLOVE BIOGEL INDICATOR SZ 8.5 SURGICAL PF LTX - (50/BX 4BX/CA)

## (undated) DEVICE — GLOVE BIOGEL PI INDICATOR SZ 7.0 SURGICAL PF LF - (50/BX 4BX/CA)

## (undated) DEVICE — SPONGE GAUZESTER. 2X2 4-PL - (2/PK 50PK/BX 30BX/CS)

## (undated) DEVICE — GLOVE BIOGEL SZ 8.5 SURGICAL PF LTX - (50PR/BX 4BX/CA)

## (undated) DEVICE — TRAY FOLEY CATHETER STATLOCK 16FR SURESTEP  (10EA/CA)

## (undated) DEVICE — CANISTER SUCTION RIGID RED 1500CC (40EA/CA)

## (undated) DEVICE — CHLORAPREP 26 ML APPLICATOR - ORANGE TINT(25/CA)

## (undated) DEVICE — CATHETER IV 20 GA X 1-1/4 ---SURG.& SDS ONLY--- (50EA/BX)

## (undated) DEVICE — DRAPE VAGINAL BIB W/ POUCH (10EA/CA)

## (undated) DEVICE — KIT ANESTHESIA W/CIRCUIT & 3/LT BAG W/FILTER (20EA/CA)

## (undated) DEVICE — PROTECTOR ULNA NERVE - (36PR/CA)

## (undated) DEVICE — SUTURE 2-0 VICRYL PLUS CT-2 - 27 INCH (36/BX)

## (undated) DEVICE — ARMREST CRADLE FOAM - (2PR/PK 12PR/CA)

## (undated) DEVICE — BANDAID SHEER STRIP 3/4 IN (100EA/BX 12BX/CA)

## (undated) DEVICE — TUBE E-T HI-LO CUFF 7.0MM (10EA/PK)

## (undated) DEVICE — TROCAR STEP 11MM - (3/CA)

## (undated) DEVICE — ELECTRODE 850 FOAM ADHESIVE - HYDROGEL RADIOTRNSPRNT (50/PK)

## (undated) DEVICE — LIGASURE 5MM BLUNT TIP LONG - 44CM (6EA/PK)

## (undated) DEVICE — NEEDLE INSUFFLATION FOR STEP - (12/BX)

## (undated) DEVICE — SLEEVE, VASO, THIGH, MED

## (undated) DEVICE — SUTURE 1 6-18IN COATED VICRYL - PLUS VIO TIES(12PK/BX)

## (undated) DEVICE — PACKING VAG 2 X-RAY (24EA/CS)

## (undated) DEVICE — BLADE SURGICAL #10 - (50/BX)

## (undated) DEVICE — SET IRRIGATION CYSTOSCOPY TUBE L80 IN (20EA/CA)

## (undated) DEVICE — TROCAR STEP 12MM - (3EA/CA)

## (undated) DEVICE — WATER IRRIGATION STERILE 1000ML (12EA/CA)

## (undated) DEVICE — GLOVE BIOGEL INDICATOR SZ 6.5 SURGICAL PF LTX - (50PR/BX 4BX/CA)

## (undated) DEVICE — TUBING CLEARLINK DUO-VENT - C-FLO (48EA/CA)

## (undated) DEVICE — SET EXTENSION WITH 2 PORTS (48EA/CA) ***PART #2C8610 IS A SUBSTITUTE*****

## (undated) DEVICE — NEPTUNE 4 PORT MANIFOLD - (20/PK)

## (undated) DEVICE — SET LEADWIRE 5 LEAD BEDSIDE DISPOSABLE ECG (1SET OF 5/EA)

## (undated) DEVICE — PACK MINOR BASIN - (2EA/CA)

## (undated) DEVICE — GLOVE SZ 7 BIOGEL PI MICRO - PF LF (50PR/BX 4BX/CA)

## (undated) DEVICE — GLOVE BIOGEL PI INDICATOR SZ 6.5 SURGICAL PF LF - (50/BX 4BX/CA)

## (undated) DEVICE — LACTATED RINGERS INJ 1000 ML - (14EA/CA 60CA/PF)

## (undated) DEVICE — CANISTER SUCTION 3000ML MECHANICAL FILTER AUTO SHUTOFF MEDI-VAC NONSTERILE LF DISP  (40EA/CA)

## (undated) DEVICE — MASK ANESTHESIA ADULT  - (100/CA)

## (undated) DEVICE — GLOVE BIOGEL SZ 6.5 SURGICAL PF LTX (50PR/BX 4BX/CA)

## (undated) DEVICE — PAD BABY LAP 4X18 W/O - RINGS PREWASHED 5/PK 40PK/CS

## (undated) DEVICE — LEAD SET 6 DISP. EKG NIHON KOHDEN

## (undated) DEVICE — SYRINGE SAFETY 3 ML 18 GA X 1 1/2 BLUNT LL (100/BX 8BX/CA)

## (undated) DEVICE — GLOVE SZ 6.5 BIOGEL PI MICRO - PF LF (50PR/BX)

## (undated) DEVICE — HEAD HOLDER JUNIOR/ADULT

## (undated) DEVICE — SUTURE 0 VICRYL PLUS CT-1 - 36 INCH (36/BX)